# Patient Record
Sex: FEMALE | Race: WHITE | Employment: OTHER | URBAN - METROPOLITAN AREA
[De-identification: names, ages, dates, MRNs, and addresses within clinical notes are randomized per-mention and may not be internally consistent; named-entity substitution may affect disease eponyms.]

---

## 2017-03-31 PROBLEM — M35.00 SJOGREN'S DISEASE (HCC): Status: ACTIVE | Noted: 2017-03-31

## 2017-03-31 NOTE — PROGRESS NOTES
[facility] [place]  PROGRESS NOTE  Chief Complaint:   Patient presents with:  Medication Follow-Up      HPI:   This is a 81year old female coming in for medication follow-up. She said that she has been feeling worse recently.   She has had ryan MCG Oral Tab Take 1 tablet by mouth every morning before breakfast. Disp:  Rfl:    Calcium Carbonate-Vitamin D (CALCIUM + D OR) Take 1 tablet by mouth daily.  Disp:  Rfl:    FLUoxetine HCl 20 MG Oral Tab Take 60 mg daily (40 mg plus 20 mg) Disp: 30 tablet R atraumatic Eyes: EOMI, PERRLA, no scleral icterus, conjunctivae clear bilaterally, no eye discharge Ears: External normal. Nose: patent, no nasal discharge Throat:  No tonsillar erythema or exudate. Mouth:  No oral lesions or ulcerations, good dentition. to learning. Medical education done. Outcome: Patient verbalizes understanding. Patient is notified to call with any questions, complications, allergies, or worsening or changing symptoms.   Patient is to call with any side effects or complications from t

## 2017-04-28 NOTE — H&P
[facility] [place]  PRE-OP NOTE    HPI:   Jersey Deng is a 81year old female with a hx of ***, who presents for a pre-operative physical exam. Patient is to have ***, to by done by Dr. Yesenia Andrews at [facility] on ***.      No results found for this or any or fatigue. EENT:  Eyes:  Denies eye pain, visual loss, blurred vision, double vision or yellow sclerae. Ears, Nose, Throat:  Denies hearing loss, sneezing, congestion, runny nose or sore throat.   INTEGUMENTARY:  Denies rashes, itching, skin lesion, or ex ulcerations, good dentition. NECK: Supple, no CLAD, no JVD, no carotid bruit, no thyromegaly. SKIN: No rashes, no skin lesion, no bruising, good turgor. HEART:  Regular rate and rhythm, no murmurs, rubs or gallops.   LUNGS: Clear to auscultation bilteral

## 2017-04-28 NOTE — PROGRESS NOTES
[facility] [place]  PROGRESS NOTE  Chief Complaint:   Patient presents with:  Medication Follow-Up      HPI:   This is a 81year old female coming in for low up on her medication. She feels much better. Her anxiety level has dropped.   She is n Answered       REVIEW OF SYSTEMS:   CONSTITUTIONAL:  Denies unusual weight gain/loss, fever, chills, or fatigue. EENT:  Eyes:  Denies eye pain, visual loss, blurred vision, double vision or yellow sclerae.  Ears, Nose, Throat:  Denies hearing loss, sneezin now.      Meds & Refills for this Visit:  No prescriptions requested or ordered in this encounter    Patient/Caregiver Education: Patient/Caregiver Education: There are no barriers to learning. Medical education done.    Outcome: Patient verbalizes Sophia

## 2017-06-08 ENCOUNTER — TELEPHONE (OUTPATIENT)
Dept: FAMILY MEDICINE CLINIC | Facility: CLINIC | Age: 72
End: 2017-06-08

## 2017-06-08 DIAGNOSIS — Z00.00 HEALTH CARE MAINTENANCE: ICD-10-CM

## 2017-06-08 DIAGNOSIS — E03.9 ACQUIRED HYPOTHYROIDISM: Primary | ICD-10-CM

## 2017-06-08 NOTE — TELEPHONE ENCOUNTER
As above. Please advise lab orders.  Liza Medina, 06/08/2017, 11:51 AM    Future Appointments  Date Time Provider Guerita Verdin   7/28/2017 9:00 AM Aamir Flores MD [facility] [place] [facility]   10/6/2017 8:15 AM REF [place] REF [facility]

## 2017-07-28 NOTE — PROGRESS NOTES
[address]  PROGRESS NOTE  Chief Complaint:   Patient presents with: Follow - Up      HPI:   This is a 81year old female coming in for follow-up.     She has been doing very well on the combination of fluoxetine 40 mg and fluoxetine 20 20 mg) Disp: 30 capsule Rfl: 5      Counseling given: Not Answered       REVIEW OF SYSTEMS:   CONSTITUTIONAL:  Denies unusual weight gain/loss, fever, chills, or fatigue.   EENT:  Eyes:  Denies eye pain, visual loss, blurred vision, double vision or yellow good turgor. HEART:  Regular rate and rhythm, no murmurs, rubs or gallops. LUNGS: Clear to auscultation bilterally, no rales/rhonchi/wheezing. ASSESSMENT AND PLAN:   1.  Recurrent major depressive disorder, in partial remission ([facility] [address]  Her depression is

## 2017-08-22 ENCOUNTER — MED REC SCAN ONLY (OUTPATIENT)
Dept: FAMILY MEDICINE CLINIC | Facility: CLINIC | Age: 72
End: 2017-08-22

## 2017-09-30 RX ORDER — LEVOTHYROXINE SODIUM 88 MCG
TABLET ORAL
Qty: 90 TABLET | Refills: 0 | Status: SHIPPED | OUTPATIENT
Start: 2017-09-30 | End: 2017-12-26

## 2017-09-30 NOTE — TELEPHONE ENCOUNTER
Last RF Synthroid: 5/13/13    Future appt:     Your appointments     Date & Time Appointment Department [facility])    Oct 06, 2017  8:15 AM CDT Laboratory Visit with SELINA Zel[facility] ([facility])    Oct 10, 2017  9:30 AM CDT [facility]

## 2017-10-06 ENCOUNTER — LABORATORY ENCOUNTER (OUTPATIENT)
Dept: LAB | Age: 72
End: 2017-10-06
Attending: FAMILY MEDICINE
Payer: MEDICARE

## 2017-10-06 DIAGNOSIS — Z00.00 HEALTH CARE MAINTENANCE: ICD-10-CM

## 2017-10-06 DIAGNOSIS — E03.9 ACQUIRED HYPOTHYROIDISM: ICD-10-CM

## 2017-10-06 PROCEDURE — 80053 COMPREHEN METABOLIC PANEL: CPT

## 2017-10-06 PROCEDURE — 85025 COMPLETE CBC W/AUTO DIFF WBC: CPT

## 2017-10-06 PROCEDURE — 84443 ASSAY THYROID STIM HORMONE: CPT

## 2017-10-06 PROCEDURE — 80061 LIPID PANEL: CPT

## 2017-10-06 PROCEDURE — 84550 ASSAY OF BLOOD/URIC ACID: CPT

## 2017-10-06 PROCEDURE — 36415 COLL VENOUS BLD VENIPUNCTURE: CPT

## 2017-10-10 ENCOUNTER — OFFICE VISIT (OUTPATIENT)
Dept: FAMILY MEDICINE CLINIC | Facility: CLINIC | Age: 72
End: 2017-10-10

## 2017-10-10 VITALS
RESPIRATION RATE: 14 BRPM | DIASTOLIC BLOOD PRESSURE: 72 MMHG | HEART RATE: 88 BPM | HEIGHT: 58.5 IN | BODY MASS INDEX: 21.86 KG/M2 | SYSTOLIC BLOOD PRESSURE: 122 MMHG | TEMPERATURE: 98 F | WEIGHT: 107 LBS

## 2017-10-10 DIAGNOSIS — F43.10 POSTTRAUMATIC STRESS DISORDER: ICD-10-CM

## 2017-10-10 DIAGNOSIS — M16.0 PRIMARY OSTEOARTHRITIS OF BOTH HIPS: ICD-10-CM

## 2017-10-10 DIAGNOSIS — E78.49 FAMILIAL MULTIPLE LIPOPROTEIN-TYPE HYPERLIPIDEMIA: ICD-10-CM

## 2017-10-10 DIAGNOSIS — Z00.00 ENCOUNTER FOR ANNUAL HEALTH EXAMINATION: Primary | ICD-10-CM

## 2017-10-10 DIAGNOSIS — M81.0 AGE-RELATED OSTEOPOROSIS WITHOUT CURRENT PATHOLOGICAL FRACTURE: ICD-10-CM

## 2017-10-10 DIAGNOSIS — K11.7 DISTURBANCE OF SALIVARY SECRETION: ICD-10-CM

## 2017-10-10 DIAGNOSIS — G43.909 MIGRAINE WITHOUT STATUS MIGRAINOSUS, NOT INTRACTABLE, UNSPECIFIED MIGRAINE TYPE: ICD-10-CM

## 2017-10-10 DIAGNOSIS — L20.82 FLEXURAL ECZEMA: ICD-10-CM

## 2017-10-10 DIAGNOSIS — E03.9 ACQUIRED HYPOTHYROIDISM: ICD-10-CM

## 2017-10-10 DIAGNOSIS — F33.41 RECURRENT MAJOR DEPRESSIVE DISORDER, IN PARTIAL REMISSION (HCC): ICD-10-CM

## 2017-10-10 DIAGNOSIS — M35.01 SJOGREN'S SYNDROME WITH KERATOCONJUNCTIVITIS SICCA (HCC): ICD-10-CM

## 2017-10-10 PROCEDURE — G0009 ADMIN PNEUMOCOCCAL VACCINE: HCPCS | Performed by: FAMILY MEDICINE

## 2017-10-10 PROCEDURE — 99214 OFFICE O/P EST MOD 30 MIN: CPT | Performed by: FAMILY MEDICINE

## 2017-10-10 PROCEDURE — 90653 IIV ADJUVANT VACCINE IM: CPT | Performed by: FAMILY MEDICINE

## 2017-10-10 PROCEDURE — 90732 PPSV23 VACC 2 YRS+ SUBQ/IM: CPT | Performed by: FAMILY MEDICINE

## 2017-10-10 PROCEDURE — G0439 PPPS, SUBSEQ VISIT: HCPCS | Performed by: FAMILY MEDICINE

## 2017-10-10 PROCEDURE — G0008 ADMIN INFLUENZA VIRUS VAC: HCPCS | Performed by: FAMILY MEDICINE

## 2017-10-10 RX ORDER — OMEGA-3 FATTY ACIDS/FISH OIL 300-1000MG
1 CAPSULE ORAL AS NEEDED
COMMUNITY

## 2017-10-10 NOTE — PROGRESS NOTES
[facility] [place]  PROGRESS NOTE  Chief Complaint:   Patient presents with:  Physical      HPI:   This is a 81year old female coming in for annual Medicare wellness exam.  She said that overall she has been feeling good and doing well.     Her 219 (H) <130 mg/dL   -ASSAY, THYROID STIM HORMONE   Result Value Ref Range   TSH 3.340 0.350 - 5.500 mIU/mL   -URIC ACID, SERUM   Result Value Ref Range   Uric Acid 4.9 2.4 - 8.0 mg/dL   -CBC W/ DIFFERENTIAL   Result Value Ref Range   WBC 5.9 4.0 - 13.0 x1 Outpatient Prescriptions:  Ibuprofen 200 MG Oral Cap Take 1 capsule by mouth as needed.  Disp:  Rfl:    SYNTHROID 88 MCG Oral Tab TAKE ONE TABLET BY MOUTH EVERY DAY Disp: 90 tablet Rfl: 0   Carboxymethylcellulose Sodium (REFRESH LIQUIGEL) 1 % Ophthalmic Sol Pulse 88   Temp 97.7 °F (36.5 °C) (Tympanic)   Resp 14   Ht 58.5\"   Wt 107 lb   BMI 21.98 kg/m²  Estimated body mass index is 21.98 kg/m² as calculated from the following:    Height as of this encounter: 58.5\". Weight as of this encounter: 107 lb.    V treatment recommended for this now. 4. Sjogren's syndrome with keratoconjunctivitis sicca ([address]  Her children's syndrome has improved dramatically. No new treatment recommended for this now.     5. Disturbance of salivary secretion  Her salivary secreti disease ([facility]     Primary osteoarthritis of both hips      Rafaela Muñoz MD  10/10/2017  10:13 AM

## 2017-10-16 ENCOUNTER — MED REC SCAN ONLY (OUTPATIENT)
Dept: FAMILY MEDICINE CLINIC | Facility: CLINIC | Age: 72
End: 2017-10-16

## 2017-11-30 ENCOUNTER — OFFICE VISIT (OUTPATIENT)
Dept: FAMILY MEDICINE CLINIC | Facility: CLINIC | Age: 72
End: 2017-11-30

## 2017-11-30 VITALS
TEMPERATURE: 99 F | SYSTOLIC BLOOD PRESSURE: 142 MMHG | HEART RATE: 100 BPM | HEIGHT: 58.5 IN | DIASTOLIC BLOOD PRESSURE: 78 MMHG | WEIGHT: 108 LBS | BODY MASS INDEX: 22.07 KG/M2

## 2017-11-30 DIAGNOSIS — B02.9 HERPES ZOSTER WITHOUT COMPLICATION: Primary | ICD-10-CM

## 2017-11-30 DIAGNOSIS — R03.0 ELEVATED BLOOD PRESSURE READING WITHOUT DIAGNOSIS OF HYPERTENSION: ICD-10-CM

## 2017-11-30 PROCEDURE — 99214 OFFICE O/P EST MOD 30 MIN: CPT | Performed by: FAMILY MEDICINE

## 2017-11-30 RX ORDER — VALACYCLOVIR HYDROCHLORIDE 1 G/1
1 TABLET, FILM COATED ORAL 3 TIMES DAILY
Qty: 21 TABLET | Refills: 0 | Status: SHIPPED | OUTPATIENT
Start: 2017-11-30 | End: 2017-12-07

## 2017-11-30 NOTE — PATIENT INSTRUCTIONS
Start valacyclovir today. Use tylenol or ibuprofen as needed   Use topical calamine lotion as needed   Return to clinic if any increase redness, pain, warmth, fever or oozing. Monitor blood pressure, return to clinic if remains elevated.

## 2017-11-30 NOTE — PROGRESS NOTES
[facility] [place]  PROGRESS NOTE  Chief Complaint:   Patient presents with:  Shingles      HPI:   This is a 81year old female presents complaining of painful rash that started on left flank region yesterday.   Patient initially had painful sens 0. 57 0.10 - 0.60 x10(3) uL   Eosinophil Absolute 0.24 0.00 - 0.30 x10(3) uL   Basophil Absolute 0.06 0.00 - 0.10 x10(3) uL   Immature Granulocyte Absolute 0.01 0.00 - 1.00 x10(3) uL   Neutrophil % 53.1 %   Lymphocyte % 31.9 %   Monocyte % 9.7 %   Eosinophi Disp: 30 capsule Rfl: 5      Counseling given: Not Answered         REVIEW OF SYSTEMS:   CONSTITUTIONAL:  Denies unusual weight gain/loss, fever, chills, or fatigue.    INTEGUMENTARY: See HPI  CARDIOVASCULAR:  Denies chest pain, chest pressure, chest discom Normal ROM, no joint pain, or muscle weakness in all extremity. BACK: No tenderness, no spasm, SLR test negative, FROM. NEUROLOGICAL:  No deficit, normal gait, strength and tone, sensory intact, normal reflexes.   PSYCHIATRIC: Alert and oriented x 3; af

## 2017-12-04 ENCOUNTER — OFFICE VISIT (OUTPATIENT)
Dept: FAMILY MEDICINE CLINIC | Facility: CLINIC | Age: 72
End: 2017-12-04

## 2017-12-04 VITALS
HEIGHT: 59 IN | TEMPERATURE: 97 F | WEIGHT: 108.13 LBS | HEART RATE: 90 BPM | BODY MASS INDEX: 21.8 KG/M2 | DIASTOLIC BLOOD PRESSURE: 86 MMHG | SYSTOLIC BLOOD PRESSURE: 160 MMHG | RESPIRATION RATE: 14 BRPM

## 2017-12-04 DIAGNOSIS — F41.1 ANXIETY STATE: ICD-10-CM

## 2017-12-04 DIAGNOSIS — B02.9 HERPES ZOSTER WITHOUT COMPLICATION: Primary | ICD-10-CM

## 2017-12-04 DIAGNOSIS — F33.41 RECURRENT MAJOR DEPRESSIVE DISORDER, IN PARTIAL REMISSION (HCC): ICD-10-CM

## 2017-12-04 PROCEDURE — 99213 OFFICE O/P EST LOW 20 MIN: CPT | Performed by: FAMILY MEDICINE

## 2017-12-04 NOTE — PROGRESS NOTES
[facility] [place]  PROGRESS NOTE  Chief Complaint:   Patient presents with:  Shingles: Continously spreading      HPI:   This is a 81year old female coming in for follow-up on her shingles.     She saw Dr. Cherie Woodward November 30 and was started on g/dL   RDW 12.2 11.5 - 16.0 %   RDW-SD 43.2 35.1 - 46.3 fL   Neutrophil Absolute Prelim 3.12 1.30 - 6.70 x10 (3) uL   Neutrophil Absolute 3.12 1.30 - 6.70 x10(3) uL   Lymphocyte Absolute 1.87 0.90 - 4.00 x10(3) uL   Monocyte Absolute 0.57 0.10 - 0.60 x10(3 (CALCIUM + D OR) Take 1 tablet by mouth daily.  Disp:  Rfl:    FLUoxetine HCl 40 MG Oral Cap Take 60 mg daily (40 mg plus 20 mg) (Patient taking differently: Take 40 mg by mouth daily.  ) Disp: 30 capsule Rfl: 5      Counseling given: Not Answered       REV developed, well nourished, no apparent distress.   HEENT:  Head:  Normocephalic, atraumatic Eyes: EOMI, PERRLA, no scleral icterus, conjunctivae clear bilaterally, no eye discharge Ears: External normal. Nose: patent, no nasal discharge Throat:  No tonsilla Outcome: Patient verbalizes understanding. Patient is notified to call with any questions, complications, allergies, or worsening or changing symptoms. Patient is to call with any side effects or complications from the treatments as a result of today.

## 2017-12-13 ENCOUNTER — OFFICE VISIT (OUTPATIENT)
Dept: FAMILY MEDICINE CLINIC | Facility: CLINIC | Age: 72
End: 2017-12-13

## 2017-12-13 VITALS
HEART RATE: 88 BPM | DIASTOLIC BLOOD PRESSURE: 82 MMHG | RESPIRATION RATE: 16 BRPM | HEIGHT: 59 IN | WEIGHT: 107.5 LBS | SYSTOLIC BLOOD PRESSURE: 148 MMHG | BODY MASS INDEX: 21.67 KG/M2 | TEMPERATURE: 96 F

## 2017-12-13 DIAGNOSIS — B02.9 HERPES ZOSTER WITHOUT COMPLICATION: ICD-10-CM

## 2017-12-13 DIAGNOSIS — R52 BODY ACHES: Primary | ICD-10-CM

## 2017-12-13 DIAGNOSIS — F33.1 MODERATE EPISODE OF RECURRENT MAJOR DEPRESSIVE DISORDER (HCC): ICD-10-CM

## 2017-12-13 PROCEDURE — 99214 OFFICE O/P EST MOD 30 MIN: CPT | Performed by: FAMILY MEDICINE

## 2017-12-13 RX ORDER — FLUOXETINE HYDROCHLORIDE 40 MG/1
CAPSULE ORAL
Qty: 30 CAPSULE | Refills: 5 | COMMUNITY
Start: 2017-12-13 | End: 2018-04-10

## 2017-12-13 RX ORDER — FLUOXETINE 20 MG/1
TABLET, FILM COATED ORAL
Qty: 30 TABLET | Refills: 5 | Status: SHIPPED | OUTPATIENT
Start: 2017-12-13 | End: 2018-05-15

## 2017-12-14 NOTE — PROGRESS NOTES
[address]  PROGRESS NOTE  Chief Complaint:   Patient presents with: Follow - Up: Shingles - still in a lot of pain      HPI:   This is a 81year old female coming in for follow-up on her shingles.   She reported that she has completed t STIM HORMONE   Result Value Ref Range   TSH 3.340 0.350 - 5.500 mIU/mL   -URIC ACID, SERUM   Result Value Ref Range   Uric Acid 4.9 2.4 - 8.0 mg/dL   -CBC W/ DIFFERENTIAL   Result Value Ref Range   WBC 5.9 4.0 - 13.0 x10(3) uL   RBC 4.34 3.80 - 5.10 x10(6) Prescriptions:  FLUoxetine HCl 40 MG Oral Cap Take 60 mg daily (40 mg plus 20 mg) Disp: 30 capsule Rfl: 5   Ibuprofen 200 MG Oral Cap Take 1 capsule by mouth as needed.  Disp:  Rfl:    SYNTHROID 88 MCG Oral Tab TAKE ONE TABLET BY MOUTH EVERY DAY Disp: 90 ta 148/82 (BP Location: Left arm, Patient Position: Sitting, Cuff Size: adult)   Pulse 88   Temp (!) 96.1 °F (35.6 °C) (Tympanic)   Resp 16   Ht 59\"   Wt 107 lb 8 oz   BMI 21.71 kg/m²  Estimated body mass index is 21.71 kg/m² as calculated from the following them.      Meds & Refills for this Visit:  No prescriptions requested or ordered in this encounter       Patient/Caregiver Education: Patient/Caregiver Education: There are no barriers to learning. Medical education done.    Outcome: Patient verbalizes unde

## 2017-12-26 RX ORDER — LEVOTHYROXINE SODIUM 88 MCG
TABLET ORAL
Qty: 90 TABLET | Refills: 3 | Status: SHIPPED | OUTPATIENT
Start: 2017-12-26 | End: 2019-01-17

## 2017-12-26 NOTE — TELEPHONE ENCOUNTER
Future appt:     Your appointments     Date & Time Appointment Department [facility])    Apr 10, 2018  9:30 AM CDT Follow up - Extended with Karishma Irving MD [facility] [place] ([facility])        [address] [place] [address]

## 2018-04-10 PROBLEM — B02.9 HERPES ZOSTER WITHOUT COMPLICATION: Status: RESOLVED | Noted: 2017-12-04 | Resolved: 2018-04-10

## 2018-04-10 NOTE — PROGRESS NOTES
[address]  PROGRESS NOTE  Chief Complaint:   Patient presents with: Follow - Up      HPI:   This is a Dominic Simmons [age]year old female coming in for follow-up on her medications. Her shingles have completely resolved.   She has a little bit of bur Prelim 3.12 1.30 - 6.70 x10 (3) uL   Neutrophil Absolute 3.12 1.30 - 6.70 x10(3) uL   Lymphocyte Absolute 1.87 0.90 - 4.00 x10(3) uL   Monocyte Absolute 0.57 0.10 - 0.60 x10(3) uL   Eosinophil Absolute 0.24 0.00 - 0.30 x10(3) uL   Basophil Absolute 0.06 0. + D OR) Take 2 tablets by mouth daily. Disp:  Rfl:       Counseling given: Not Answered       REVIEW OF SYSTEMS:   CONSTITUTIONAL:  Denies unusual weight gain/loss, fever, chills, or fatigue.   EENT:  Eyes:  Denies eye pain, visual loss, blurred vision, d apparent distress. HEENT:  Head:  Normocephalic, atraumatic Eyes: EOMI, PERRLA, no scleral icterus, conjunctivae clear bilaterally, no eye discharge Ears: External normal. Nose: patent, no nasal discharge Throat:  No tonsillar erythema or exudate.   Mouth: or female climacteric states     Migraine     Osteoporosis     Posttraumatic stress disorder     Sjogren's disease ([facility] [address]     Primary osteoarthritis of both hips     Elevated blood pressure reading without diagnosis of hypertension     Body aches      MU

## 2018-05-15 NOTE — PROGRESS NOTES
[facility] [place]  PROGRESS NOTE  Chief Complaint:   Patient presents with:  Medication Request: would like to adjust fluoxetine      HPI:   This is a 72year old female coming in for follow-up on her depression and anxiety.   She said that she - 6.70 x10 (3) uL   Neutrophil Absolute 3.12 1.30 - 6.70 x10(3) uL   Lymphocyte Absolute 1.87 0.90 - 4.00 x10(3) uL   Monocyte Absolute 0.57 0.10 - 0.60 x10(3) uL   Eosinophil Absolute 0.24 0.00 - 0.30 x10(3) uL   Basophil Absolute 0.06 0.00 - 0.10 x10(3) by Each eye route as needed. Disp:  Rfl:    Calcium Carbonate-Vitamin D (CALCIUM + D OR) Take 2 tablets by mouth daily.    Disp:  Rfl:       Counseling given: Not Answered       REVIEW OF SYSTEMS:   CONSTITUTIONAL:  Denies unusual weight gain/loss, fever, Nose: patent, no nasal discharge Throat:  No tonsillar erythema or exudate. Mouth:  No oral lesions or ulcerations, good dentition. NECK: Supple, no CLAD, no JVD, no thyromegaly. SKIN: No rashes, no skin lesion, no bruising, good turgor.   HEART:  Regula diagnosis of hypertension     Body aches      Cong Aguayo MD  5/15/2018  2:57 PM

## 2018-08-01 ENCOUNTER — TELEPHONE (OUTPATIENT)
Dept: FAMILY MEDICINE CLINIC | Facility: CLINIC | Age: 73
End: 2018-08-01

## 2018-08-01 NOTE — PROGRESS NOTES
[facility] [place]  PROGRESS NOTE  Chief Complaint:   Patient presents with:  Medication Problem: Fluoxetine - increased anxiety and depression      HPI:   This is a 72year old female coming in for increased anxiety.   She said that she has been 150.0 - 450.0 10(3)uL   MCV 96.5 81.0 - 100.0 fL   MCH 30.2 27.0 - 33.2 pg   MCHC 31.3 31.0 - 37.0 g/dL   RDW 12.2 11.5 - 16.0 %   RDW-SD 43.2 35.1 - 46.3 fL   Neutrophil Absolute Prelim 3.12 1.30 - 6.70 x10 (3) uL   Neutrophil Absolute 3.12 1.30 - 6.70 x1 Rfl:    Carboxymethylcellulose Sodium (REFRESH LIQUIGEL) 1 % Ophthalmic Solution 1 drop by Each eye route as needed. Disp:  Rfl:    Calcium Carbonate-Vitamin D (CALCIUM + D OR) Take 2 tablets by mouth daily.    Disp:  Rfl:       Counseling given: Not Answ encounter: 59\". Weight as of this encounter: 98 lb 6 oz. Vital signs reviewed. Physical Exam:  GEN:  Patient is alert, awake and oriented, well developed, well nourished, no apparent distress.   HEENT:  Head:  Normocephalic, atraumatic Eyes: EOMI, PE education done. Outcome: Patient verbalizes understanding. Patient is notified to call with any questions, complications, allergies, or worsening or changing symptoms.   Patient is to call with any side effects or complications from the treatments as a re

## 2018-08-01 NOTE — TELEPHONE ENCOUNTER
Pt scheduled to see Dr. Gisela Hurd today.      Future Appointments  Date Time Provider Guerita Velvet   8/1/2018 1:00 PM Star Martinez MD [facility] [place] [facility] [place]   8/8/2018 9:00 AM Star Martinez MD [facility] [place] [facility] [place]   10/9/2018 8:00 AM

## 2018-08-08 NOTE — PROGRESS NOTES
[address]  PROGRESS NOTE  Chief Complaint:   Patient presents with: Follow - Up      HPI:   This is a 72year old female coming in for follow-up on her depression. She has increase the fluoxetine to 80 mg daily.   She said she is feeli 0.00 - 0.30 x10(3) uL   Basophil Absolute 0.06 0.00 - 0.10 x10(3) uL   Immature Granulocyte Absolute 0.01 0.00 - 1.00 x10(3) uL   Neutrophil % 53.1 %   Lymphocyte % 31.9 %   Monocyte % 9.7 %   Eosinophil % 4.1 %   Basophil % 1.0 %   Immature Granulocyte % Denies eye pain, visual loss, blurred vision, double vision or yellow sclerae. Ears, Nose, Throat:  Denies hearing loss, sneezing, congestion, runny nose or sore throat. INTEGUMENTARY:  Denies rashes, itching, skin lesion, or excessive skin dryness.   CARD erythema or exudate. Mouth:  No oral lesions or ulcerations, good dentition. NECK: Supple, no CLAD, no JVD, no thyromegaly. SKIN: No rashes, no skin lesion, no bruising, good turgor. HEART:  Regular rate and rhythm, no murmurs, rubs or gallops.   LUNGS: hyperlipidemia     Hypothyroidism     Symptomatic menopausal or female climacteric states     Migraine     Osteoporosis     Posttraumatic stress disorder     Sjogren's disease ([address]     Primary osteoarthritis of both hips     Elevated blood pressure readin

## 2018-08-27 ENCOUNTER — MED REC SCAN ONLY (OUTPATIENT)
Dept: FAMILY MEDICINE CLINIC | Facility: CLINIC | Age: 73
End: 2018-08-27

## 2018-08-28 NOTE — PROGRESS NOTES
[address]  PROGRESS NOTE  Chief Complaint:   Patient presents with: Follow - Up      HPI:   This is a 72year old female coming in for follow-up on her depression and anxiety. She has been taking fluoxetine 80 mg daily.   This has not RDW-SD 43.2 35.1 - 46.3 fL   Neutrophil Absolute Prelim 3.12 1.30 - 6.70 x10 (3) uL   Neutrophil Absolute 3.12 1.30 - 6.70 x10(3) uL   Lymphocyte Absolute 1.87 0.90 - 4.00 x10(3) uL   Monocyte Absolute 0.57 0.10 - 0.60 x10(3) uL   Eosinophil Absolute 0.2 Carboxymethylcellulose Sodium (REFRESH LIQUIGEL) 1 % Ophthalmic Solution 1 drop by Each eye route as needed. Disp:  Rfl:    Calcium Carbonate-Vitamin D (CALCIUM + D OR) Take 2 tablets by mouth daily.    Disp:  Rfl:       Counseling given: Not Answered She is somewhat shaky. She smiled through the course of the interview and did not cry.   HEENT:  Head:  Normocephalic, atraumatic Eyes: EOMI, PERRLA, no scleral icterus, conjunctivae clear bilaterally, no eye discharge Ears: External normal. Nose: patent, side effects or complications from the treatments as a result of today.      Problem List:  Patient Active Problem List:     Anxiety state     Depression     Tear film insufficiency     Disturbance of salivary secretion     Eczema     Familial multiple lipo

## 2018-08-29 ENCOUNTER — TELEPHONE (OUTPATIENT)
Dept: FAMILY MEDICINE CLINIC | Facility: CLINIC | Age: 73
End: 2018-08-29

## 2018-08-29 NOTE — TELEPHONE ENCOUNTER
Prior Authorization calling to confirm what the risks were for patient to take the Paroxitine. Also asking if the benefit from the medication outways the risk. Informed yes to both.     Informed again that patient is weaning from fluoxitine to only be on

## 2018-08-31 NOTE — TELEPHONE ENCOUNTER
Medication Approved until 8/2019. Approval faxed to [place].  See scanned image also of signed document by Dr Janae Jung  Confirming below.   Kelly Swann, 08/31/18, 9:25 AM

## 2018-10-08 NOTE — PROGRESS NOTES
[facility] [place]  PROGRESS NOTE  Chief Complaint:   Patient presents with:  Medication Follow-Up      HPI:   This is a 72year old female coming in for follow-up on her medications. She was able to successfully stop the fluoxetine.   She is no 3. 12 1.30 - 6.70 x10 (3) uL    Neutrophil Absolute 3.12 1.30 - 6.70 x10(3) uL    Lymphocyte Absolute 1.87 0.90 - 4.00 x10(3) uL    Monocyte Absolute 0.57 0.10 - 0.60 x10(3) uL    Eosinophil Absolute 0.24 0.00 - 0.30 x10(3) uL    Basophil Absolute 0.06 0.00 route as needed. Disp:  Rfl:    Calcium Carbonate-Vitamin D (CALCIUM + D OR) Take 2 tablets by mouth daily.    Disp:  Rfl:       Counseling given: Not Answered       REVIEW OF SYSTEMS:   CONSTITUTIONAL:  Denies unusual weight gain/loss, fever, chills, or well developed, well nourished, no apparent distress.   HEENT:  Head:  Normocephalic, atraumatic Eyes: EOMI, PERRLA, no scleral icterus, conjunctivae clear bilaterally, no eye discharge Ears: External normal. Nose: patent, no nasal discharge Throat:  No ton disorder     [facility]'s disease ([facility]     Primary osteoarthritis of both hips     Elevated blood pressure reading without diagnosis of hypertension     Body aches      Brian Brooks MD  10/8/2018  10:42 AM

## 2018-10-10 ENCOUNTER — LABORATORY ENCOUNTER (OUTPATIENT)
Dept: LAB | Age: 73
End: 2018-10-10
Attending: FAMILY MEDICINE
Payer: MEDICARE

## 2018-10-10 ENCOUNTER — TELEPHONE (OUTPATIENT)
Dept: FAMILY MEDICINE CLINIC | Facility: CLINIC | Age: 73
End: 2018-10-10

## 2018-10-10 DIAGNOSIS — F41.1 ANXIETY STATE: ICD-10-CM

## 2018-10-10 DIAGNOSIS — E03.9 ACQUIRED HYPOTHYROIDISM: ICD-10-CM

## 2018-10-10 DIAGNOSIS — E78.49 FAMILIAL MULTIPLE LIPOPROTEIN-TYPE HYPERLIPIDEMIA: ICD-10-CM

## 2018-10-10 DIAGNOSIS — F33.1 MODERATE EPISODE OF RECURRENT MAJOR DEPRESSIVE DISORDER (HCC): ICD-10-CM

## 2018-10-10 PROCEDURE — 84443 ASSAY THYROID STIM HORMONE: CPT

## 2018-10-10 PROCEDURE — 36415 COLL VENOUS BLD VENIPUNCTURE: CPT

## 2018-10-10 PROCEDURE — 80061 LIPID PANEL: CPT

## 2018-10-10 PROCEDURE — 80053 COMPREHEN METABOLIC PANEL: CPT

## 2018-10-10 PROCEDURE — 85025 COMPLETE CBC W/AUTO DIFF WBC: CPT

## 2018-10-10 PROCEDURE — 84550 ASSAY OF BLOOD/URIC ACID: CPT

## 2018-10-11 NOTE — TELEPHONE ENCOUNTER
----- Message from Farzaneh Perea MD sent at 10/10/2018  6:49 PM CDT -----  Please call Namrata Fang. Her blood count is normal.  Her thyroid is slightly low but I do not recommend any changes in medication now.   Her cholesterol is 283 which is similar to las

## 2018-10-12 NOTE — PATIENT INSTRUCTIONS
Recommended Websites for Advanced Directives    [employer]. org/publications/Documents/personal_dec. pdf  An information packet, including necessary form from the [employer] website. http://www. [place].state. [place]/public/books/adv

## 2018-10-12 NOTE — PROGRESS NOTES
[facility] [place]  PROGRESS NOTE  Chief Complaint:   Patient presents with:  Physical      HPI:   This is a 72year old female coming in for her annual Medicare wellness exam.    She feels like the depression is starting to improve a little bit 5.10 x10(6)uL    HGB 12.2 12.0 - 16.0 g/dL    HCT 39.4 34.0 - 50.0 %    .0 150.0 - 450.0 10(3)uL    MCV 94.3 81.0 - 100.0 fL    MCH 29.2 27.0 - 33.2 pg    MCHC 31.0 31.0 - 37.0 g/dL    RDW 12.9 11.5 - 16.0 %    RDW-SD 44.4 35.1 - 46.3 fL    Neutroph every morning. Disp: 30 tablet Rfl: 5   SYNTHROID 88 MCG Oral Tab TAKE ONE TABLET BY MOUTH EVERY DAY Disp: 90 tablet Rfl: 3   Ibuprofen 200 MG Oral Cap Take 1 capsule by mouth as needed.  Disp:  Rfl:    Carboxymethylcellulose Sodium (REFRESH LIQUIGEL) 1 % O 96.2 °F (35.7 °C) (Tympanic)   Resp 16   Ht 60\"   Wt 103 lb 2 oz   BMI 20.14 kg/m²  Estimated body mass index is 20.14 kg/m² as calculated from the following:    Height as of this encounter: 60\". Weight as of this encounter: 103 lb 2 oz.    Vital signs dose of Synthroid now. If her next TSH is also elevated then we will increase the dose of her Synthroid. 5. Flexural eczema  She has a history of flexural eczema. It is not flaring now.     6. Sjogren's syndrome with keratoconjunctivitis sicca ([facility] [address]  S

## 2018-10-29 NOTE — PROGRESS NOTES
[facility] [place]  PROGRESS NOTE  Chief Complaint:   Patient presents with:  Depression: anti depression medication is not helping  Diarrhea      HPI:   This is a 72year old female coming in for several concerns.   First, she feels like the swi ASSAY, THYROID STIM HORMONE   Result Value Ref Range    TSH 6.930 (H) 0.350 - 5.500 mIU/mL   URIC ACID, SERUM   Result Value Ref Range    Uric Acid 5.1 2.4 - 8.0 mg/dL   CBC W/ DIFFERENTIAL   Result Value Ref Range    WBC 6.7 4.0 - 13.0 x10(3) uL    RBC Comment: At stress test - made pt feel \"crazy'  Amoxicillin-Pot Cla*        Comment:Other reaction(s): rash itching  Sulfa Antibiotics         Current Meds:    Current Outpatient Medications:  Venlafaxine HCl 37.5 MG Oral Tab Take 1 tablet (37.5 mg total) asthma, sneezing, hives, eczema or rhinitis.      EXAM:   /80 (BP Location: Left arm, Patient Position: Sitting, Cuff Size: adult)   Pulse 80   Temp 98.6 °F (37 °C) (Temporal)   Resp 18   Ht 60\"   Wt 101 lb   BMI 19.73 kg/m²  Estimated body mass inde Patient/Caregiver Education: There are no barriers to learning. Medical education done. Outcome: Patient verbalizes understanding. Patient is notified to call with any questions, complications, allergies, or worsening or changing symptoms.   Patient is to

## 2018-11-30 NOTE — PROGRESS NOTES
[address]  PROGRESS NOTE  Chief Complaint:   Patient presents with: Follow - Up      HPI:   This is a 72year old female coming in for follow-up on her new depression medication. She has been taking venlafaxine 37.5 mg twice a day.   Phoebe Lion MCHC 31.0 31.0 - 37.0 g/dL    RDW 12.9 11.5 - 16.0 %    RDW-SD 44.4 35.1 - 46.3 fL    Neutrophil Absolute Prelim 4.13 1.30 - 6.70 x10 (3) uL    Neutrophil Absolute 4.13 1.30 - 6.70 x10(3) uL    Lymphocyte Absolute 1.38 0.90 - 4.00 x10(3) uL    Monocyte ONE TABLET BY MOUTH EVERY DAY Disp: 90 tablet Rfl: 3   Ibuprofen 200 MG Oral Cap Take 1 capsule by mouth as needed. Disp:  Rfl:    Carboxymethylcellulose Sodium (REFRESH LIQUIGEL) 1 % Ophthalmic Solution 1 drop by Each eye route as needed.    Disp:  Rfl: scleral icterus, conjunctivae clear bilaterally, no eye discharge Ears: External normal. Nose: patent, no nasal discharge Throat:  No tonsillar erythema or exudate. Mouth:  No oral lesions or ulcerations, good dentition.   NECK: Supple, no CLAD, no JVD, no PM

## 2018-12-18 ENCOUNTER — OFFICE VISIT (OUTPATIENT)
Dept: FAMILY MEDICINE CLINIC | Facility: CLINIC | Age: 73
End: 2018-12-18
Payer: MEDICARE

## 2018-12-18 VITALS
SYSTOLIC BLOOD PRESSURE: 136 MMHG | HEIGHT: 60 IN | HEART RATE: 110 BPM | WEIGHT: 97.5 LBS | BODY MASS INDEX: 19.14 KG/M2 | DIASTOLIC BLOOD PRESSURE: 84 MMHG | RESPIRATION RATE: 14 BRPM | TEMPERATURE: 97 F

## 2018-12-18 DIAGNOSIS — F33.1 MODERATE EPISODE OF RECURRENT MAJOR DEPRESSIVE DISORDER (HCC): ICD-10-CM

## 2018-12-18 DIAGNOSIS — R41.3 MEMORY LOSS: Primary | ICD-10-CM

## 2018-12-18 PROCEDURE — 99214 OFFICE O/P EST MOD 30 MIN: CPT | Performed by: FAMILY MEDICINE

## 2018-12-18 RX ORDER — LAMOTRIGINE 25 MG/1
25 TABLET ORAL DAILY
Qty: 30 TABLET | Refills: 0 | Status: SHIPPED | OUTPATIENT
Start: 2018-12-18 | End: 2019-01-08

## 2018-12-18 NOTE — PROGRESS NOTES
[facility] [place]  PROGRESS NOTE  Chief Complaint:   Patient presents with:  Memory Loss: Family is concerned about progressing memory issues      HPI:   This is a 72year old female coming in for progressive memory loss.   The family reports th -American 85 >=60    AST 22 15 - 41 U/L    Alt 27 14 - 54 U/L    Alkaline Phosphatase 74 55 - 142 U/L    Bilirubin, Total 0.3 0.1 - 2.0 mg/dL    Total Protein 8.0 6.4 - 8.2 g/dL    Albumin 3.5 3.1 - 4.5 g/dL    Globulin  4.5 (H) 2.8 - 4.4 g/dL    A/ tobacco: Never Used    Alcohol use:  Yes      Alcohol/week: 1.2 oz      Types: 2 Glasses of wine per week    Drug use: No    Family History:  Family History   Problem Relation Age of Onset   • Other (Other) Mother      Allergies:    Iodine (Topical) bruising. LYMPHATICS:  Denies enlarged nodes or history of splenectomy. PSYCHIATRIC: Although her depression is somewhat better her memory loss is been dramatically worse since starting the venlafaxine. Her family notes dramatic mood swings as well.   EN Moderate episode of recurrent major depressive disorder ([facility] [address]  She does continue to show signs and symptoms of depression but she has not done well with any of the antidepressants that we have tried.   The next step will be to try a mood stabilizer first to

## 2019-01-09 RX ORDER — LAMOTRIGINE 25 MG/1
TABLET ORAL
Qty: 30 TABLET | Refills: 1 | Status: SHIPPED | OUTPATIENT
Start: 2019-01-09 | End: 2019-01-15

## 2019-01-09 NOTE — TELEPHONE ENCOUNTER
Future appt:     Your appointments     Date & Time Appointment Department [facility])    Lee 15, 2019 11:00 AM CST Follow up with Betty Denis MD [address] [place] [address])    Mar 01, 2019 10:00 AM CST Ex

## 2019-01-15 ENCOUNTER — TELEPHONE (OUTPATIENT)
Dept: FAMILY MEDICINE CLINIC | Facility: CLINIC | Age: 74
End: 2019-01-15

## 2019-01-15 RX ORDER — LAMOTRIGINE 50 MG/1
50 TABLET, EXTENDED RELEASE ORAL DAILY
Qty: 30 TABLET | Refills: 5 | Status: SHIPPED | OUTPATIENT
Start: 2019-01-15 | End: 2019-01-16

## 2019-01-15 NOTE — PROGRESS NOTES
[facility] [place]  PROGRESS NOTE  Chief Complaint:   Patient presents with:  Medication Follow-Up: Lamotrigine      HPI:   This is a 72year old female coming in for follow-up on her medication.   She is not having any side effects from the lamo 1.0 - 2.0   ASSAY, THYROID STIM HORMONE   Result Value Ref Range    TSH 6.930 (H) 0.350 - 5.500 mIU/mL   URIC ACID, SERUM   Result Value Ref Range    Uric Acid 5.1 2.4 - 8.0 mg/dL   CBC W/ DIFFERENTIAL   Result Value Ref Range    WBC 6.7 4.0 - 13.0 x10(3) COMMENTS)    Comment: At stress test - made pt feel \"crazy'  Amoxicillin-Pot Cla*        Comment:Other reaction(s): rash itching  Sulfa Antibiotics         Current Meds:    Current Outpatient Medications:  naproxen 250 MG Oral Tab Take 250 mg by mouth 2 (t cleared.       EXAM:   /72 (BP Location: Left arm, Patient Position: Sitting, Cuff Size: adult)   Pulse 90   Temp (!) 96.4 °F (35.8 °C) (Tympanic)   Resp 14   Ht 60\"   Wt 100 lb 2 oz   BMI 19.55 kg/m²  Estimated body mass index is 19.55 kg/m² as calc symptoms. Patient is to call with any side effects or complications from the treatments as a result of today.      Problem List:  Patient Active Problem List:     Anxiety state     Depression     Tear film insufficiency     Disturbance of salivary secretio

## 2019-01-15 NOTE — TELEPHONE ENCOUNTER
Pharmacy questions if Dr Giselle Quesada wanted the disolveable tablets ordered or if new Rx for plain 50mg Lamotrigine tablets should be sent? Please advise.   Romy Garrett, 01/15/19, 11:44 AM

## 2019-01-16 ENCOUNTER — TELEPHONE (OUTPATIENT)
Dept: FAMILY MEDICINE CLINIC | Facility: CLINIC | Age: 74
End: 2019-01-16

## 2019-01-16 RX ORDER — LAMOTRIGINE 25 MG/1
25 TABLET ORAL 2 TIMES DAILY
Qty: 60 TABLET | Refills: 5 | Status: SHIPPED | OUTPATIENT
Start: 2019-01-16 | End: 2019-02-12

## 2019-01-16 NOTE — TELEPHONE ENCOUNTER
Pharmacy requesting change from Lamotrigine ER 50mg to Lamotrigine-Plain 25mg BID or plain 50mg daily. Please advise.   Subhash Calzada, 01/16/19, 3:34 PM

## 2019-01-17 NOTE — TELEPHONE ENCOUNTER
Future appt:     Your appointments     Date & Time Appointment Department [facility])    Feb 12, 2019 10:00 AM CST Follow up - Extended with Leti Yu MD [address], [address] ([facility])    Mar 01, 2019 10:0

## 2019-01-18 RX ORDER — LEVOTHYROXINE SODIUM 88 MCG
TABLET ORAL
Qty: 90 TABLET | Refills: 3 | Status: SHIPPED | OUTPATIENT
Start: 2019-01-18 | End: 2019-08-27

## 2019-02-12 NOTE — PROGRESS NOTES
[place] [facility] [place]  PROGRESS NOTE  Chief Complaint:   Patient presents with:  Medication Follow-Up      HPI:   This is a 72year old female coming in for follow-up on her medication. She said she is feeling better but still not great.   She sa URIC ACID, SERUM   Result Value Ref Range    Uric Acid 5.1 2.4 - 8.0 mg/dL   CBC W/ DIFFERENTIAL   Result Value Ref Range    WBC 6.7 4.0 - 13.0 x10(3) uL    RBC 4.18 3.80 - 5.10 x10(6)uL    HGB 12.2 12.0 - 16.0 g/dL    HCT 39.4 34.0 - 50.0 %    . 0 reaction(s): rash itching  Sulfa Antibiotics         Current Meds:    Current Outpatient Medications:  lamoTRIgine 25 MG Oral Tab Take 1 tablet (25 mg total) by mouth 2 (two) times daily.  Disp: 60 tablet Rfl: 5   escitalopram 5 MG Oral Tab Take 1 tablet (5 excessive sweating, cold or heat intolerance, polyuria or polydipsia. ALLERGIES:  Denies allergic response, history of asthma, sneezing, hives, eczema or rhinitis.      EXAM:   /62 (BP Location: Left arm, Patient Position: Sitting, Cuff Size: adult) 03/14/1945  DEXA Scan due on 03/14/2012  FIT Colorectal Screening due on 09/09/2016    Patient/Caregiver Education: Patient/Caregiver Education: There are no barriers to learning. Medical education done. Outcome: Patient verbalizes understanding.  Patient

## 2019-02-19 ENCOUNTER — TELEPHONE (OUTPATIENT)
Dept: FAMILY MEDICINE CLINIC | Facility: CLINIC | Age: 74
End: 2019-02-19

## 2019-02-19 NOTE — TELEPHONE ENCOUNTER
Please definitely stop taking the escitalopram.  I do not recommend adding another medication now and we need to give this time to get out of the system. We will wait a full week and see how things are going.

## 2019-02-19 NOTE — TELEPHONE ENCOUNTER
Patient states since starting Citalopram She has had increased depression. Has lashed out at . Woke up this morning crying. States the crying is what woke her up. She is stopping Citalopram.    Please advise.   Christine Sifuentes, 02/19/19, 12:05 PM

## 2019-03-12 NOTE — PROGRESS NOTES
[place] [facility] [place]  PROGRESS NOTE  Chief Complaint:   Patient presents with:  Medication Follow-Up      HPI:   This is a 72year old female coming in for follow-up on her medication. Her  is present with her today.   She reports that her 0.350 - 5.500 mIU/mL   URIC ACID, SERUM   Result Value Ref Range    Uric Acid 5.1 2.4 - 8.0 mg/dL   CBC W/ DIFFERENTIAL   Result Value Ref Range    WBC 6.7 4.0 - 13.0 x10(3) uL    RBC 4.18 3.80 - 5.10 x10(6)uL    HGB 12.2 12.0 - 16.0 g/dL    HCT 39.4 34.0 Comment: At stress test - made pt feel \"crazy'  Amoxicillin-Pot Cla*        Comment:Other reaction(s): rash itching  Sulfa Antibiotics         Venlafaxine             OTHER (SEE COMMENTS)    Comment:forgetfullness  Current Meds:    Current Outpatient Medic rhinitis.      EXAM:   /72 (BP Location: Left arm, Patient Position: Sitting, Cuff Size: adult)   Pulse 102   Temp 96.7 °F (35.9 °C) (Tympanic)   Resp 14   Ht 60\"   Wt 103 lb   BMI 20.12 kg/m²  Estimated body mass index is 20.12 kg/m² as calculated f understanding. Patient is notified to call with any questions, complications, allergies, or worsening or changing symptoms. Patient is to call with any side effects or complications from the treatments as a result of today.      Problem List:  Patient Acti

## 2019-04-19 PROBLEM — F41.1 GAD (GENERALIZED ANXIETY DISORDER): Status: ACTIVE | Noted: 2019-04-19

## 2019-04-19 NOTE — PROGRESS NOTES
[place] [facility] [place]  PROGRESS NOTE  Chief Complaint:   Patient presents with:  Medication Follow-Up      HPI:   This is a 69year old female coming in for follow-up on her medication.   She said that the last couple of weeks she has been feeling a - 450.0 10(3)uL    MCV 94.3 81.0 - 100.0 fL    MCH 29.2 27.0 - 33.2 pg    MCHC 31.0 31.0 - 37.0 g/dL    RDW 12.9 11.5 - 16.0 %    RDW-SD 44.4 35.1 - 46.3 fL    Neutrophil Absolute Prelim 4.13 1.30 - 6.70 x10 (3) uL    Neutrophil Absolute 4.13 1.30 - 6.70 x Oral Tab Take 2 tablets (50 mg total) by mouth 2 (two) times daily.  Disp: 360 tablet Rfl: 1   SYNTHROID 88 MCG Oral Tab TAKE ONE TABLET BY MOUTH ONE TIME DAILY  Disp: 90 tablet Rfl: 3   naproxen 250 MG Oral Tab Take 250 mg by mouth 2 (two) times daily with Pulse 80   Temp (!) 96.1 °F (35.6 °C) (Tympanic)   Resp 18   Ht 60\"   Wt 104 lb   BMI 20.31 kg/m²  Estimated body mass index is 20.31 kg/m² as calculated from the following:    Height as of this encounter: 60\". Weight as of this encounter: 104 lb. Problem List:     Anxiety state     Depression     Tear film insufficiency     Disturbance of salivary secretion     Eczema     Familial multiple lipoprotein-type hyperlipidemia     Hypothyroidism     Symptomatic menopausal or female climacteric states

## 2019-05-10 ENCOUNTER — TELEPHONE (OUTPATIENT)
Dept: FAMILY MEDICINE CLINIC | Facility: CLINIC | Age: 74
End: 2019-05-10

## 2019-05-10 NOTE — TELEPHONE ENCOUNTER
Please continue taking the same dose of the lamotrigine. The bupropion is in addition to the lamotrigine.

## 2019-05-10 NOTE — TELEPHONE ENCOUNTER
Asking if Patient is to continue Lamotrigine along with Bupriopion or is she to stop the Lamotrigine? Please advise.   Pooja Baker, 05/10/19, 4:37 PM

## 2019-05-10 NOTE — PROGRESS NOTES
[address]  PROGRESS NOTE  Chief Complaint:   Patient presents with: Anxiety: Everyday - more increasing depression/memory concerns      HPI:   This is a 69year old female coming in for continued anxiety.   She said she still feels very mg/dL   CBC W/ DIFFERENTIAL   Result Value Ref Range    WBC 6.7 4.0 - 13.0 x10(3) uL    RBC 4.18 3.80 - 5.10 x10(6)uL    HGB 12.2 12.0 - 16.0 g/dL    HCT 39.4 34.0 - 50.0 %    .0 150.0 - 450.0 10(3)uL    MCV 94.3 81.0 - 100.0 fL    MCH 29.2 27.0 - 3 reaction(s): rash itching  Sulfa Antibiotics         Venlafaxine             OTHER (SEE COMMENTS)    Comment:forgetfullness  Current Meds:    Current Outpatient Medications:  diphenhydrAMINE HCl 25 MG Oral Cap Take 25 mg by mouth every 6 (six) hours as nee extremities,change in bowel or bladder control. HEMATOLOGIC:  Denies anemia, bleeding or bruising. LYMPHATICS:  Denies enlarged nodes or history of splenectomy. PSYCHIATRIC: She is frustrated because she continues to have the anxiety.   ENDOCRINOLOGIC: Visit:  Requested Prescriptions     Signed Prescriptions Disp Refills   • buPROPion HCl ER, SR, 150 MG Oral Tablet 12 Hr 60 tablet 5     Sig: Take 1 tablet (150 mg total) by mouth 2 (two) times daily.        Health Maintenance:  Colonoscopy due on 03/14/194

## 2019-05-17 ENCOUNTER — TELEPHONE (OUTPATIENT)
Dept: FAMILY MEDICINE CLINIC | Facility: CLINIC | Age: 74
End: 2019-05-17

## 2019-05-17 NOTE — TELEPHONE ENCOUNTER
Patient states she stopped her new rx due to feeling confused/crying/anxious. Requesting follow up with Dr. Claire Oden. Appt given Monday 3pm.    Brook PAL, 05/17/19, 10:24 AM    Future appt:     Your appointments     Date & Time Appointment Department (C

## 2019-05-20 NOTE — PROGRESS NOTES
[address]  PROGRESS NOTE  Chief Complaint:   Patient presents with: Follow - Up: Medication      HPI:   This is a 69year old female coming in for follow-up on her medication.   The family reports that she became very confused when she 12.0 - 16.0 g/dL    HCT 39.4 34.0 - 50.0 %    .0 150.0 - 450.0 10(3)uL    MCV 94.3 81.0 - 100.0 fL    MCH 29.2 27.0 - 33.2 pg    MCHC 31.0 31.0 - 37.0 g/dL    RDW 12.9 11.5 - 16.0 %    RDW-SD 44.4 35.1 - 46.3 fL    Neutrophil Absolute Prelim 4.13 1. COMMENTS)    Comment:forgetfullness  Current Meds:    Current Outpatient Medications:  diphenhydrAMINE HCl 25 MG Oral Cap Take 25 mg by mouth every 6 (six) hours as needed for Itching, Allergies or Sleep.  Disp:  Rfl:    lamoTRIgine 25 MG Oral Tab Take 2 ta sweating, cold or heat intolerance, polyuria or polydipsia. ALLERGIES:  Denies allergic response, history of asthma, sneezing, hives, eczema or rhinitis.      EXAM:   /80 (BP Location: Right arm, Patient Position: Sitting, Cuff Size: adult)   Pulse 9 deal of trouble for her. I do not recommend adding another antidepressant now. Stop the bupropion. Continue lamotrigine 50 mg twice a day. Recheck in 2 months. She will look for services available through the senior center.   They will consider pascual

## 2019-06-14 ENCOUNTER — TELEPHONE (OUTPATIENT)
Dept: FAMILY MEDICINE CLINIC | Facility: CLINIC | Age: 74
End: 2019-06-14

## 2019-06-14 NOTE — TELEPHONE ENCOUNTER
Patient states she has stopped all Anti Depressants. Patient states She wakes up in the middle of the night crying. States she cries during the day. Patient/Spouse wanting patient to see Psychiatrist.  They will be calling Elvis for Appt.     Rand Patino

## 2019-06-14 NOTE — TELEPHONE ENCOUNTER
Patient states that she used to be on depression pills but was taken off of those, patient states that she is feeling depressed again. Would like a call back.

## 2019-06-27 ENCOUNTER — LAB ENCOUNTER (OUTPATIENT)
Dept: LAB | Age: 74
End: 2019-06-27
Attending: FAMILY MEDICINE
Payer: MEDICARE

## 2019-06-27 DIAGNOSIS — E03.8 OTHER SPECIFIED HYPOTHYROIDISM: ICD-10-CM

## 2019-06-27 DIAGNOSIS — K59.1 FUNCTIONAL DIARRHEA: ICD-10-CM

## 2019-06-27 PROCEDURE — 36415 COLL VENOUS BLD VENIPUNCTURE: CPT

## 2019-06-27 PROCEDURE — 84443 ASSAY THYROID STIM HORMONE: CPT

## 2019-06-27 PROCEDURE — 80053 COMPREHEN METABOLIC PANEL: CPT

## 2019-06-27 PROCEDURE — 85025 COMPLETE CBC W/AUTO DIFF WBC: CPT

## 2019-06-27 NOTE — PATIENT INSTRUCTIONS
Trial of tums 2-3 times per day. Stop ibuprofen  Stool for blood test,   Await labs. Trial of probiotic.

## 2019-06-27 NOTE — PROGRESS NOTES
[facility] [place]  PROGRESS NOTE        HPI:   This is a 69year old female coming in for Patient presents with:  Depression    HPI her intestines are bothering her and have been giving her a lot of trouble for the past couple weeks.   She has n CBC W/ DIFFERENTIAL   Result Value Ref Range    WBC 6.7 4.0 - 13.0 x10(3) uL    RBC 4.18 3.80 - 5.10 x10(6)uL    HGB 12.2 12.0 - 16.0 g/dL    HCT 39.4 34.0 - 50.0 %    .0 150.0 - 450.0 10(3)uL    MCV 94.3 81.0 - 100.0 fL    MCH 29.2 27.0 - 33.2 pg feel \"crazy'  Amoxicillin-Pot Cla*        Comment:Other reaction(s): rash itching  Bupropion               CONFUSION  Sulfa Antibiotics         Venlafaxine             OTHER (SEE COMMENTS)    Comment:forgetfullness  Current Meds:    Current Outpatient Med Encounters:  06/27/19 : 98 lb 3.2 oz  05/20/19 : 100 lb 6.4 oz  05/10/19 : 101 lb 8 oz  04/19/19 : 104 lb  03/12/19 : 103 lb  02/12/19 : 102 lb 8 oz     BP Readings from Last 3 Encounters:  06/27/19 : 136/86  05/20/19 : 140/80  05/10/19 : 136/86       [place] Appointment Department [facility])    Jul 23, 2019  9:00 AM CDT Exam - Established Patient with Abbe Dong MD [address] [facility] ([facility])            [address],  [address] [place]

## 2019-06-28 ENCOUNTER — TELEPHONE (OUTPATIENT)
Dept: FAMILY MEDICINE CLINIC | Facility: CLINIC | Age: 74
End: 2019-06-28

## 2019-06-28 NOTE — TELEPHONE ENCOUNTER
----- Message from Jose Menjivar MD sent at 6/28/2019  8:29 AM CDT -----  Labs look ok. Follow up with Dr. Randall Bower. Await stool cultures.

## 2019-06-28 NOTE — TELEPHONE ENCOUNTER
Pt informed of below result/recommendation. Pt verbalized understanding. No other questions at this time.

## 2019-07-01 ENCOUNTER — TELEPHONE (OUTPATIENT)
Dept: FAMILY MEDICINE CLINIC | Facility: CLINIC | Age: 74
End: 2019-07-01

## 2019-07-01 NOTE — TELEPHONE ENCOUNTER
Pharmacist asking if Generic Synthroid Ok to give? Informed they will need to ask Patient/Spouse if ok to substitute. Unsure if patient requested name brand due to the way it works. She will contact patient.   Lisa Menard, 07/01/19, 1:49 PM

## 2019-07-05 ENCOUNTER — TELEPHONE (OUTPATIENT)
Dept: FAMILY MEDICINE CLINIC | Facility: CLINIC | Age: 74
End: 2019-07-05

## 2019-07-05 NOTE — TELEPHONE ENCOUNTER
Medical Records request was received from [facility] requesting us to obtain & disclose medical records. Request sent to [employer].

## 2019-07-23 ENCOUNTER — OFFICE VISIT (OUTPATIENT)
Dept: FAMILY MEDICINE CLINIC | Facility: CLINIC | Age: 74
End: 2019-07-23
Payer: MEDICARE

## 2019-07-23 VITALS
SYSTOLIC BLOOD PRESSURE: 132 MMHG | HEIGHT: 59 IN | TEMPERATURE: 96 F | HEART RATE: 92 BPM | RESPIRATION RATE: 18 BRPM | BODY MASS INDEX: 19.15 KG/M2 | WEIGHT: 95 LBS | DIASTOLIC BLOOD PRESSURE: 86 MMHG

## 2019-07-23 DIAGNOSIS — E03.9 ACQUIRED HYPOTHYROIDISM: ICD-10-CM

## 2019-07-23 DIAGNOSIS — F41.1 GAD (GENERALIZED ANXIETY DISORDER): ICD-10-CM

## 2019-07-23 DIAGNOSIS — K59.1 FUNCTIONAL DIARRHEA: Primary | ICD-10-CM

## 2019-07-23 PROCEDURE — 99214 OFFICE O/P EST MOD 30 MIN: CPT | Performed by: FAMILY MEDICINE

## 2019-07-23 RX ORDER — LOPERAMIDE HYDROCHLORIDE 2 MG/1
2 CAPSULE ORAL 4 TIMES DAILY PRN
COMMUNITY

## 2019-07-23 NOTE — PROGRESS NOTES
[address]  PROGRESS NOTE  Chief Complaint:   Patient presents with: Follow - Up      HPI:   This is a 69year old female coming in for a follow-up visit. She said that she often awakens during the night crying.   She is able to go ba 4.1 3.4 - 5.0 g/dL    Globulin  3.7 2.8 - 4.4 g/dL    A/G Ratio 1.1 1.0 - 2.0   TSH W REFLEX TO FREE T4   Result Value Ref Range    TSH 0.875 0.358 - 3.740 mIU/mL   CBC W/ DIFFERENTIAL   Result Value Ref Range    WBC 6.7 4.0 - 11.0 x10(3) uL    RBC 4.50 3. Comment:Crying/Lashing Out  Iodine (Topical)        OTHER (SEE COMMENTS)    Comment: At stress test - made pt feel \"crazy'  Amoxicillin-Pot Cla*        Comment:Other reaction(s): rash itching  Bupropion               CONFUSION  Sulfa Antibiotics         Ve extremities,change in bowel or bladder control. HEMATOLOGIC:  Denies anemia, bleeding or bruising. LYMPHATICS:  Denies enlarged nodes or history of splenectomy. PSYCHIATRIC:  Denies depression or anxiety.   ENDOCRINOLOGIC:  Denies excessive sweating, col disorder)  She continues with generalized anxiety disorder. The lamotrigine has been helpful but it clearly is not adequate for controlling her symptoms alone. Plan: Please see Dr. Evelin Pemberton for further treatment.   In the past, she has taken bupropion, escit

## 2019-08-23 ENCOUNTER — TELEPHONE (OUTPATIENT)
Dept: FAMILY MEDICINE CLINIC | Facility: CLINIC | Age: 74
End: 2019-08-23

## 2019-08-23 NOTE — TELEPHONE ENCOUNTER
Patient states he's been having intestinal problems, wants appointment with Dr Doretha Miner- no openings until 9/17. Patient doesn't want to wait that long, would like a call back this afternoon.

## 2019-08-23 NOTE — TELEPHONE ENCOUNTER
Patient has been having loose stools especially in the morning for quite sometime. Would like to have evaluated. Appt given Monday 8/26 9:30 with  Roscoe Davila, 08/23/19, 1:46 PM    Future appt:     Your appointments     Date & Time Appointmen

## 2019-08-26 ENCOUNTER — APPOINTMENT (OUTPATIENT)
Dept: LAB | Age: 74
End: 2019-08-26
Attending: NURSE PRACTITIONER
Payer: MEDICARE

## 2019-08-26 ENCOUNTER — OFFICE VISIT (OUTPATIENT)
Dept: FAMILY MEDICINE CLINIC | Facility: CLINIC | Age: 74
End: 2019-08-26
Payer: MEDICARE

## 2019-08-26 VITALS
WEIGHT: 94.25 LBS | SYSTOLIC BLOOD PRESSURE: 124 MMHG | DIASTOLIC BLOOD PRESSURE: 82 MMHG | TEMPERATURE: 97 F | HEIGHT: 59 IN | BODY MASS INDEX: 19 KG/M2 | HEART RATE: 108 BPM | RESPIRATION RATE: 14 BRPM

## 2019-08-26 DIAGNOSIS — R19.4 CHANGE IN BOWEL HABITS: Primary | ICD-10-CM

## 2019-08-26 DIAGNOSIS — F34.1 DYSTHYMIA: ICD-10-CM

## 2019-08-26 DIAGNOSIS — R19.5 LOOSE STOOLS: ICD-10-CM

## 2019-08-26 PROBLEM — F33.1 MODERATE EPISODE OF RECURRENT MAJOR DEPRESSIVE DISORDER (HCC): Status: ACTIVE | Noted: 2019-07-25

## 2019-08-26 LAB
ALBUMIN SERPL-MCNC: 3.8 G/DL (ref 3.4–5)
ALBUMIN/GLOB SERPL: 1 {RATIO} (ref 1–2)
ALP LIVER SERPL-CCNC: 53 U/L (ref 55–142)
ALT SERPL-CCNC: 21 U/L (ref 13–56)
ANION GAP SERPL CALC-SCNC: 6 MMOL/L (ref 0–18)
AST SERPL-CCNC: 15 U/L (ref 15–37)
BASOPHILS # BLD AUTO: 0.04 X10(3) UL (ref 0–0.2)
BASOPHILS NFR BLD AUTO: 0.7 %
BILIRUB SERPL-MCNC: 0.3 MG/DL (ref 0.1–2)
BUN BLD-MCNC: 17 MG/DL (ref 7–18)
BUN/CREAT SERPL: 15.9 (ref 10–20)
CALCIUM BLD-MCNC: 9.2 MG/DL (ref 8.5–10.1)
CHLORIDE SERPL-SCNC: 108 MMOL/L (ref 98–112)
CO2 SERPL-SCNC: 29 MMOL/L (ref 21–32)
CREAT BLD-MCNC: 1.07 MG/DL (ref 0.55–1.02)
DEPRECATED RDW RBC AUTO: 41.6 FL (ref 35.1–46.3)
EOSINOPHIL # BLD AUTO: 0.28 X10(3) UL (ref 0–0.7)
EOSINOPHIL NFR BLD AUTO: 5.2 %
ERYTHROCYTE [DISTWIDTH] IN BLOOD BY AUTOMATED COUNT: 11.9 % (ref 11–15)
GLOBULIN PLAS-MCNC: 3.8 G/DL (ref 2.8–4.4)
GLUCOSE BLD-MCNC: 95 MG/DL (ref 70–99)
HAV IGM SER QL: 2.3 MG/DL (ref 1.6–2.6)
HCT VFR BLD AUTO: 40.1 % (ref 35–48)
HGB BLD-MCNC: 12.8 G/DL (ref 12–16)
IMM GRANULOCYTES # BLD AUTO: 0.01 X10(3) UL (ref 0–1)
IMM GRANULOCYTES NFR BLD: 0.2 %
LYMPHOCYTES # BLD AUTO: 1.47 X10(3) UL (ref 1–4)
LYMPHOCYTES NFR BLD AUTO: 27.1 %
M PROTEIN MFR SERPL ELPH: 7.6 G/DL (ref 6.4–8.2)
MCH RBC QN AUTO: 30 PG (ref 26–34)
MCHC RBC AUTO-ENTMCNC: 31.9 G/DL (ref 31–37)
MCV RBC AUTO: 94.1 FL (ref 80–100)
MONOCYTES # BLD AUTO: 0.42 X10(3) UL (ref 0.1–1)
MONOCYTES NFR BLD AUTO: 7.7 %
NEUTROPHILS # BLD AUTO: 3.2 X10 (3) UL (ref 1.5–7.7)
NEUTROPHILS # BLD AUTO: 3.2 X10(3) UL (ref 1.5–7.7)
NEUTROPHILS NFR BLD AUTO: 59.1 %
OSMOLALITY SERPL CALC.SUM OF ELEC: 297 MOSM/KG (ref 275–295)
PLATELET # BLD AUTO: 334 10(3)UL (ref 150–450)
POTASSIUM SERPL-SCNC: 3.7 MMOL/L (ref 3.5–5.1)
RBC # BLD AUTO: 4.26 X10(6)UL (ref 3.8–5.3)
SODIUM SERPL-SCNC: 143 MMOL/L (ref 136–145)
T4 FREE SERPL-MCNC: 1.1 NG/DL (ref 0.8–1.7)
TSI SER-ACNC: 0.07 MIU/ML (ref 0.36–3.74)
WBC # BLD AUTO: 5.4 X10(3) UL (ref 4–11)

## 2019-08-26 PROCEDURE — 85025 COMPLETE CBC W/AUTO DIFF WBC: CPT | Performed by: NURSE PRACTITIONER

## 2019-08-26 PROCEDURE — 80053 COMPREHEN METABOLIC PANEL: CPT | Performed by: NURSE PRACTITIONER

## 2019-08-26 PROCEDURE — 84443 ASSAY THYROID STIM HORMONE: CPT | Performed by: NURSE PRACTITIONER

## 2019-08-26 PROCEDURE — 99214 OFFICE O/P EST MOD 30 MIN: CPT | Performed by: NURSE PRACTITIONER

## 2019-08-26 PROCEDURE — 84439 ASSAY OF FREE THYROXINE: CPT | Performed by: NURSE PRACTITIONER

## 2019-08-26 PROCEDURE — 36415 COLL VENOUS BLD VENIPUNCTURE: CPT | Performed by: NURSE PRACTITIONER

## 2019-08-26 PROCEDURE — 83735 ASSAY OF MAGNESIUM: CPT | Performed by: NURSE PRACTITIONER

## 2019-08-26 NOTE — PROGRESS NOTES
Reena Betancourt is a 69year old female.   Patient presents with:  Diarrhea      HPI:   Complaints of loose stools for a couple of months -   Usually in the morning-     Wakes up crying lately -   Has been more sad- does see Dr. Syeda Cornelius for depression- vivienne Take 2 tablets by mouth daily.    Disp:  Rfl:       Past Medical History:   Diagnosis Date   • Anxiety    • Anxiety state 12/6/2016   • Depression    • Familial multiple lipoprotein-type hyperlipidemia 1/12/2010   • Hyperlipidemia    • Hypothyroidism    • S normal rate without respiratory distress, lungs clear to auscultation  CARDIO: RRR without murmur, no edema  GI: normal bowel sounds, no masses, no hepatosplenomegaly, or tenderness  MUSCULOSKELETAL: no edema, normal strength and tone  PSYCH: alert and shannon

## 2019-08-27 ENCOUNTER — TELEPHONE (OUTPATIENT)
Dept: FAMILY MEDICINE CLINIC | Facility: CLINIC | Age: 74
End: 2019-08-27

## 2019-08-27 NOTE — TELEPHONE ENCOUNTER
Informed patient of below results - patient is ok to take generic. Pt verbalized and expressed understanding.

## 2019-08-27 NOTE — TELEPHONE ENCOUNTER
----- Message from IMELDA Bryant sent at 8/27/2019  1:26 PM CDT -----  Please notify patient that her TSH is a little low which means that her Synthroid dose might be too high.   According to the chart patient is currently taking 80 mcg– would recom

## 2019-09-16 NOTE — PATIENT INSTRUCTIONS
Two suggestions for the diarrhea: Take Metamucil 1 tsp daily in thick juice. Take Florastor (Probiotic) twice a day for at least a month. Trial of Boost or Ensure daily.

## 2019-09-16 NOTE — PROGRESS NOTES
[facility] [place]  PROGRESS NOTE  Chief Complaint:   Patient presents with:  Arm Pain  Abdominal Pain: Cramping/Diarrhea  Weight Loss      HPI:   This is a 69year old female coming in for multiple concerns.     First, she has pain in her right U/L    Alkaline Phosphatase 53 (L) 55 - 142 U/L    Bilirubin, Total 0.3 0.1 - 2.0 mg/dL    Total Protein 7.6 6.4 - 8.2 g/dL    Albumin 3.8 3.4 - 5.0 g/dL    Globulin  3.8 2.8 - 4.4 g/dL    A/G Ratio 1.0 1.0 - 2.0   CBC W/ DIFFERENTIAL   Result Value Ref Ra Allergies:    Escitalopram            OTHER (SEE COMMENTS)    Comment:Crying/Lashing Out  Iodine (Topical)        OTHER (SEE COMMENTS)    Comment: At stress test - made pt feel \"crazy'  Amoxicillin-Pot Cla*        Comment:Other reaction(s): rash itchin stiffness. NEUROLOGICAL:  Denies headache, seizures, dizziness, syncope, paralysis, ataxia, numbness or tingling in the extremities,change in bowel or bladder control. HEMATOLOGIC:  Denies anemia, bleeding or bruising.   LYMPHATICS:  Denies enlarged nodes nontender, bowel sounds normal in all 4 quadrants, no masses, no hepatosplenomegaly. ASSESSMENT AND PLAN:   1. TOMASA (generalized anxiety disorder)  Her generalized anxiety is getting progressively worse in spite of our therapy.   Many of the symptoms th from the treatments as a result of today.      Problem List:  Patient Active Problem List:     Anxiety state     Depression     Tear film insufficiency     Disturbance of salivary secretion     Eczema     Familial multiple lipoprotein-type hyperlipidemia

## 2019-10-10 ENCOUNTER — TELEPHONE (OUTPATIENT)
Dept: FAMILY MEDICINE CLINIC | Facility: CLINIC | Age: 74
End: 2019-10-10

## 2019-10-10 NOTE — TELEPHONE ENCOUNTER
Patient scheduled for lab draw on 10/10/19, but no showed. I called patient and she forgot all about the appt. She is scheduled to come in on 10/15/19 to see Dr Zilphia Krabbe and will just have the BW done on this date.

## 2019-10-15 ENCOUNTER — OFFICE VISIT (OUTPATIENT)
Dept: FAMILY MEDICINE CLINIC | Facility: CLINIC | Age: 74
End: 2019-10-15
Payer: MEDICARE

## 2019-10-15 VITALS
WEIGHT: 93.38 LBS | TEMPERATURE: 98 F | BODY MASS INDEX: 18.83 KG/M2 | SYSTOLIC BLOOD PRESSURE: 130 MMHG | RESPIRATION RATE: 16 BRPM | OXYGEN SATURATION: 97 % | HEART RATE: 86 BPM | HEIGHT: 59 IN | DIASTOLIC BLOOD PRESSURE: 90 MMHG

## 2019-10-15 DIAGNOSIS — M16.0 PRIMARY OSTEOARTHRITIS OF BOTH HIPS: ICD-10-CM

## 2019-10-15 DIAGNOSIS — Z23 FLU VACCINE NEED: ICD-10-CM

## 2019-10-15 DIAGNOSIS — R41.3 MEMORY LOSS: ICD-10-CM

## 2019-10-15 DIAGNOSIS — K59.1 FUNCTIONAL DIARRHEA: ICD-10-CM

## 2019-10-15 DIAGNOSIS — Z00.00 ENCOUNTER FOR ANNUAL HEALTH EXAMINATION: Primary | ICD-10-CM

## 2019-10-15 DIAGNOSIS — Z11.59 NEED FOR HEPATITIS C SCREENING TEST: ICD-10-CM

## 2019-10-15 DIAGNOSIS — E03.9 ACQUIRED HYPOTHYROIDISM: ICD-10-CM

## 2019-10-15 DIAGNOSIS — Z12.31 VISIT FOR SCREENING MAMMOGRAM: ICD-10-CM

## 2019-10-15 DIAGNOSIS — F41.1 GAD (GENERALIZED ANXIETY DISORDER): ICD-10-CM

## 2019-10-15 DIAGNOSIS — G43.909 MIGRAINE WITHOUT STATUS MIGRAINOSUS, NOT INTRACTABLE, UNSPECIFIED MIGRAINE TYPE: ICD-10-CM

## 2019-10-15 DIAGNOSIS — M35.01 SJOGREN'S SYNDROME WITH KERATOCONJUNCTIVITIS SICCA (HCC): ICD-10-CM

## 2019-10-15 DIAGNOSIS — E78.49 FAMILIAL MULTIPLE LIPOPROTEIN-TYPE HYPERLIPIDEMIA: ICD-10-CM

## 2019-10-15 DIAGNOSIS — M81.0 AGE-RELATED OSTEOPOROSIS WITHOUT CURRENT PATHOLOGICAL FRACTURE: ICD-10-CM

## 2019-10-15 DIAGNOSIS — F33.1 MODERATE EPISODE OF RECURRENT MAJOR DEPRESSIVE DISORDER (HCC): ICD-10-CM

## 2019-10-15 PROCEDURE — G0008 ADMIN INFLUENZA VIRUS VAC: HCPCS | Performed by: FAMILY MEDICINE

## 2019-10-15 PROCEDURE — 90662 IIV NO PRSV INCREASED AG IM: CPT | Performed by: FAMILY MEDICINE

## 2019-10-15 PROCEDURE — 99213 OFFICE O/P EST LOW 20 MIN: CPT | Performed by: FAMILY MEDICINE

## 2019-10-15 PROCEDURE — G0439 PPPS, SUBSEQ VISIT: HCPCS | Performed by: FAMILY MEDICINE

## 2019-10-15 NOTE — PATIENT INSTRUCTIONS
Check labs today. Influenza vaccine today. Continue the same medications. Recheck in 3 months. Please schedule mammogram.            Recommended Websites for Advanced Directives    [email or web address]  An inf

## 2019-10-15 NOTE — PROGRESS NOTES
[facility] [place]  PROGRESS NOTE  Chief Complaint:   Patient presents with:  Physical      HPI:   This is a 69year old female coming in for her annual Medicare wellness exam.  She is frustrated because she feels like her memory is getting wors Non- 52 (L) >=60    GFR, -American 60 >=60    AST 15 15 - 37 U/L    ALT 21 13 - 56 U/L    Alkaline Phosphatase 53 (L) 55 - 142 U/L    Bilirubin, Total 0.3 0.1 - 2.0 mg/dL    Total Protein 7.6 6.4 - 8.2 g/dL    Albumin 3.8 3.4 - 5.0 g No    Family History:  Family History   Problem Relation Age of Onset   • Other (Other) Mother      Allergies:    Escitalopram            OTHER (SEE COMMENTS)    Comment:Crying/Lashing Out  Iodine (Topical)        OTHER (SEE COMMENTS)    Comment: At stress improved a great deal.  She said she no longer has the uncontrollable loose stools. MUSCULOSKELETAL: He does still have some discomfort in her right hip.   NEUROLOGICAL:  Denies headache, seizures, dizziness, syncope, paralysis, ataxia, numbness or tinglin hepatosplenomegaly. EXTREMITIES:  No edema, no cyanosis, no clubbing, FROM, 2+ dorsalis pedis pulses bilaterally. NEURO:  No deficit, normal gait, strength and tone, sensory intact, normal reflexes. ASSESSMENT AND PLAN:   1.  Encounter for annual healt with a normal T4. Plan: Please check labs today.  - OFFICE/OUTPT VISIT,CHAN LINDSEY III    11. Memory loss  Her memory loss is slowly progressive. Unfortunately, she does meet the characteristics of Alzheimer's disease with associated anxiety. Plan:  At this Memory loss     TOMASA (generalized anxiety disorder)     Functional diarrhea     Moderate episode of [facility]      Laura Vizcaino MD  10/15/2019  12:50 PM

## 2019-11-18 RX ORDER — LEVOTHYROXINE SODIUM 0.07 MG/1
TABLET ORAL
Qty: 90 TABLET | Refills: 1 | Status: SHIPPED | OUTPATIENT
Start: 2019-11-18 | End: 2020-05-19

## 2020-02-26 ENCOUNTER — TELEPHONE (OUTPATIENT)
Dept: FAMILY MEDICINE CLINIC | Facility: CLINIC | Age: 75
End: 2020-02-26

## 2020-02-26 NOTE — TELEPHONE ENCOUNTER
Patient walk in. Patient states she has been shaky all day. Was supposed to have had an appointment at   [facility], but had to leave because she  Was shaking and became nauseated. On their way home and stopped at [facility]. Patient in waking room.   Franco

## 2020-02-27 ENCOUNTER — APPOINTMENT (OUTPATIENT)
Dept: LAB | Age: 75
End: 2020-02-27
Attending: NURSE PRACTITIONER
Payer: MEDICARE

## 2020-02-27 ENCOUNTER — OFFICE VISIT (OUTPATIENT)
Dept: FAMILY MEDICINE CLINIC | Facility: CLINIC | Age: 75
End: 2020-02-27
Payer: MEDICARE

## 2020-02-27 VITALS
HEIGHT: 59 IN | RESPIRATION RATE: 18 BRPM | SYSTOLIC BLOOD PRESSURE: 122 MMHG | OXYGEN SATURATION: 99 % | DIASTOLIC BLOOD PRESSURE: 80 MMHG | BODY MASS INDEX: 16.93 KG/M2 | HEART RATE: 99 BPM | TEMPERATURE: 98 F | WEIGHT: 84 LBS

## 2020-02-27 DIAGNOSIS — E87.6 HYPOKALEMIA: ICD-10-CM

## 2020-02-27 DIAGNOSIS — R63.4 WEIGHT LOSS: Primary | ICD-10-CM

## 2020-02-27 DIAGNOSIS — F34.1 DYSTHYMIA: ICD-10-CM

## 2020-02-27 DIAGNOSIS — F41.1 ANXIETY STATE: ICD-10-CM

## 2020-02-27 DIAGNOSIS — R73.9 HYPERGLYCEMIA: ICD-10-CM

## 2020-02-27 LAB
ALBUMIN SERPL-MCNC: 3.6 G/DL (ref 3.4–5)
ALBUMIN/GLOB SERPL: 1 {RATIO} (ref 1–2)
ALP LIVER SERPL-CCNC: 46 U/L (ref 55–142)
ALT SERPL-CCNC: 19 U/L (ref 13–56)
AMYLASE SERPL-CCNC: 32 U/L (ref 25–115)
ANION GAP SERPL CALC-SCNC: 5 MMOL/L (ref 0–18)
AST SERPL-CCNC: 17 U/L (ref 15–37)
BASOPHILS # BLD AUTO: 0.06 X10(3) UL (ref 0–0.2)
BASOPHILS NFR BLD AUTO: 1 %
BILIRUB SERPL-MCNC: 0.5 MG/DL (ref 0.1–2)
BUN BLD-MCNC: 13 MG/DL (ref 7–18)
BUN/CREAT SERPL: 14.4 (ref 10–20)
CALCIUM BLD-MCNC: 9 MG/DL (ref 8.5–10.1)
CHLORIDE SERPL-SCNC: 106 MMOL/L (ref 98–112)
CO2 SERPL-SCNC: 28 MMOL/L (ref 21–32)
CREAT BLD-MCNC: 0.9 MG/DL (ref 0.55–1.02)
DEPRECATED RDW RBC AUTO: 41.1 FL (ref 35.1–46.3)
EOSINOPHIL # BLD AUTO: 0.18 X10(3) UL (ref 0–0.7)
EOSINOPHIL NFR BLD AUTO: 3.1 %
ERYTHROCYTE [DISTWIDTH] IN BLOOD BY AUTOMATED COUNT: 12.1 % (ref 11–15)
EST. AVERAGE GLUCOSE BLD GHB EST-MCNC: 108 MG/DL (ref 68–126)
GLOBULIN PLAS-MCNC: 3.7 G/DL (ref 2.8–4.4)
GLUCOSE BLD-MCNC: 93 MG/DL (ref 70–99)
HBA1C MFR BLD HPLC: 5.4 % (ref ?–5.7)
HCT VFR BLD AUTO: 37.8 % (ref 35–48)
HGB BLD-MCNC: 12.6 G/DL (ref 12–16)
IMM GRANULOCYTES # BLD AUTO: 0.01 X10(3) UL (ref 0–1)
IMM GRANULOCYTES NFR BLD: 0.2 %
LIPASE SERPL-CCNC: 131 U/L (ref 73–393)
LYMPHOCYTES # BLD AUTO: 1.6 X10(3) UL (ref 1–4)
LYMPHOCYTES NFR BLD AUTO: 27.2 %
M PROTEIN MFR SERPL ELPH: 7.3 G/DL (ref 6.4–8.2)
MCH RBC QN AUTO: 31 PG (ref 26–34)
MCHC RBC AUTO-ENTMCNC: 33.3 G/DL (ref 31–37)
MCV RBC AUTO: 92.9 FL (ref 80–100)
MONOCYTES # BLD AUTO: 0.46 X10(3) UL (ref 0.1–1)
MONOCYTES NFR BLD AUTO: 7.8 %
NEUTROPHILS # BLD AUTO: 3.58 X10 (3) UL (ref 1.5–7.7)
NEUTROPHILS # BLD AUTO: 3.58 X10(3) UL (ref 1.5–7.7)
NEUTROPHILS NFR BLD AUTO: 60.7 %
OSMOLALITY SERPL CALC.SUM OF ELEC: 288 MOSM/KG (ref 275–295)
PATIENT FASTING Y/N/NP: NO
PLATELET # BLD AUTO: 273 10(3)UL (ref 150–450)
POTASSIUM SERPL-SCNC: 3.9 MMOL/L (ref 3.5–5.1)
RBC # BLD AUTO: 4.07 X10(6)UL (ref 3.8–5.3)
SODIUM SERPL-SCNC: 139 MMOL/L (ref 136–145)
T4 FREE SERPL-MCNC: 1.2 NG/DL (ref 0.8–1.7)
TSI SER-ACNC: 4.1 MIU/ML (ref 0.36–3.74)
WBC # BLD AUTO: 5.9 X10(3) UL (ref 4–11)

## 2020-02-27 PROCEDURE — 85025 COMPLETE CBC W/AUTO DIFF WBC: CPT | Performed by: NURSE PRACTITIONER

## 2020-02-27 PROCEDURE — 36415 COLL VENOUS BLD VENIPUNCTURE: CPT | Performed by: NURSE PRACTITIONER

## 2020-02-27 PROCEDURE — 84443 ASSAY THYROID STIM HORMONE: CPT | Performed by: NURSE PRACTITIONER

## 2020-02-27 PROCEDURE — 83690 ASSAY OF LIPASE: CPT | Performed by: NURSE PRACTITIONER

## 2020-02-27 PROCEDURE — 82150 ASSAY OF AMYLASE: CPT | Performed by: NURSE PRACTITIONER

## 2020-02-27 PROCEDURE — 80053 COMPREHEN METABOLIC PANEL: CPT | Performed by: NURSE PRACTITIONER

## 2020-02-27 PROCEDURE — 99215 OFFICE O/P EST HI 40 MIN: CPT | Performed by: NURSE PRACTITIONER

## 2020-02-27 PROCEDURE — 84439 ASSAY OF FREE THYROXINE: CPT | Performed by: NURSE PRACTITIONER

## 2020-02-27 PROCEDURE — 83036 HEMOGLOBIN GLYCOSYLATED A1C: CPT | Performed by: NURSE PRACTITIONER

## 2020-02-27 RX ORDER — DONEPEZIL HYDROCHLORIDE 5 MG/1
5 TABLET, FILM COATED ORAL 2 TIMES DAILY
COMMUNITY

## 2020-02-27 RX ORDER — FLUOXETINE HYDROCHLORIDE 20 MG/1
60 CAPSULE ORAL DAILY
COMMUNITY

## 2020-02-27 NOTE — PATIENT INSTRUCTIONS
Call Dr. De La Torre (neurologist)- and have him review the CT report- suspicious for normal pressure hydrocephalus. Check non fasting blood work today     follow up with Dr. Matthew Rosas on Monday as scheduled.      Try Ensure supplements -  Increase potassium rich cup diced, 300 mg to 400 mg*   Orange. Fresh 1 medium, 200 mg to 300 mg    Orange juice. Unsweetened, fresh or frozen 1/2 cup, 200 mg to 300 mg  Pineapple juice. Unsweetened 1 cup, 300 mg to 400 mg   Prune juice.  Unsweetened 1/2 cup, 300 mg to 400 mg*   Pr

## 2020-02-27 NOTE — PROGRESS NOTES
CHIEF COMPLAINT:   Patient presents with:  ER F/U: Potassium levels low  Weight Loss: not eating enough      HPI:   Dwayne Saenz is a 69year old female who presents to clinic today with complaints of not feeling well for months   States stomach bothe Start date: 1964        Quit date: 1967        Years since quittin.1      Smokeless tobacco: Never Used    Alcohol use:  Yes      Alcohol/week: 2.0 standard drinks      Types: 2 Glasses of wine per week    Drug use: No       REVIEW OF SYS Try Ensure supplements -  Increase potassium rich foods. Potassium-Rich Foods  The normal adult diet usually contains 2,000 mg to 4,000 mg of potassium per day. More potassium is needed when you lose too much potassium from your body.  This can happen Prune juice. Unsweetened 1/2 cup, 300 mg to 400 mg*   Prunes. Dried 5 pieces, 300 mg to 400 mg*   Strawberries. Fresh or frozen 1 cup, 200 mg to 300 mg  Meat  Red meat. Cooked 3 ounces, 100 mg to 300 mg   Seafood  Cod, flounder, halibut.  Cooked 3 ounces, 1

## 2020-02-28 ENCOUNTER — TELEPHONE (OUTPATIENT)
Dept: FAMILY MEDICINE CLINIC | Facility: CLINIC | Age: 75
End: 2020-02-28

## 2020-02-28 NOTE — TELEPHONE ENCOUNTER
----- Message from IMELDA Lyons sent at 2/28/2020 11:05 AM CST -----  Please notify patient/ that overall her blood work looks really good.   Her hemoglobin A1c is normal–indicating no diabetes–she is not anemic–her liver and kidney function

## 2020-02-28 NOTE — TELEPHONE ENCOUNTER
Patient's  Itz Jacobs informed of the below results and recommendations. States he will be calling the Neurologist this afternoon.

## 2020-03-02 PROBLEM — R63.4 WEIGHT LOSS: Status: ACTIVE | Noted: 2020-03-02

## 2020-03-02 NOTE — PROGRESS NOTES
[address]  PROGRESS NOTE  Chief Complaint:   Patient presents with: Follow - Up      HPI:   This is a 69year old female coming in for follow-up on her treatment. She saw Dr. Ezio Slaughter January 29 and was started on fluoxetine.   She has Average Glucose 108 68 - 126 mg/dL   TSH+FREE T4   Result Value Ref Range    Free T4 1.2 0.8 - 1.7 ng/dL    TSH 4.100 (H) 0.358 - 3.740 mIU/mL   LIPASE   Result Value Ref Range    Lipase 131 73 - 393 U/L   AMYLASE   Result Value Ref Range    Amylase 32 25 of Onset   • Other (Other) Mother      Allergies:    Escitalopram            OTHER (SEE COMMENTS)    Comment:Crying/Lashing Out  Iodine (Topical)        OTHER (SEE COMMENTS)    Comment: At stress test - made pt feel \"crazy'  Amoxicillin-Pot Cla*        Com sputum. GASTROINTESTINAL:  Denies abdominal pain, nausea, vomiting, constipation, diarrhea, or blood in stool. MUSCULOSKELETAL:  Denies weakness, muscle aches, back pain, joint pain, swelling or stiffness.   NEUROLOGICAL:  Denies headache, seizures, dizzi bowel sounds normal in all 4 quadrants, no masses, no hepatosplenomegaly. ASSESSMENT AND PLAN:   1. TOMASA (generalized anxiety disorder)  She continues with generalized anxiety but overall she is better than she had been previously.   The addition of flu disorder     Sjogren's disease ([facility] [address]     Primary osteoarthritis of both hips     Elevated blood pressure reading without diagnosis of hypertension     Body aches     Memory loss     TOMASA (generalized anxiety disorder)     Functional diarrhea     Moderate ep

## 2020-03-25 ENCOUNTER — TELEPHONE (OUTPATIENT)
Dept: FAMILY MEDICINE CLINIC | Facility: CLINIC | Age: 75
End: 2020-03-25

## 2020-03-25 DIAGNOSIS — G24.01 TARDIVE DYSKINESIA: Primary | ICD-10-CM

## 2020-03-25 PROCEDURE — G2012 BRIEF CHECK IN BY MD/QHP: HCPCS | Performed by: FAMILY MEDICINE

## 2020-03-25 NOTE — TELEPHONE ENCOUNTER
I spoke with Maria Elena Lobato and her  Robert Keyes. Over the past 7-10 days she has had increasing shakiness and trouble walking. She fell yesterday but did not have any severe injury. She has otherwise been feeling pretty good. She has not had a fever or chills.

## 2020-03-25 NOTE — TELEPHONE ENCOUNTER
Jersey Deng  verbally consents to a Virtual/Telephone Check-In service on 3/25/2020. Patient understands and accepts financial responsibility for any deductible, co-insurance and/or co-pays associated with this service. pt has been shaking more.

## 2020-04-16 ENCOUNTER — TELEPHONE (OUTPATIENT)
Dept: FAMILY MEDICINE CLINIC | Facility: CLINIC | Age: 75
End: 2020-04-16

## 2020-04-16 ENCOUNTER — OFFICE VISIT (OUTPATIENT)
Dept: FAMILY MEDICINE CLINIC | Facility: CLINIC | Age: 75
End: 2020-04-16
Payer: MEDICARE

## 2020-04-16 VITALS
WEIGHT: 78 LBS | DIASTOLIC BLOOD PRESSURE: 74 MMHG | HEART RATE: 89 BPM | OXYGEN SATURATION: 99 % | HEIGHT: 59 IN | TEMPERATURE: 98 F | RESPIRATION RATE: 18 BRPM | SYSTOLIC BLOOD PRESSURE: 118 MMHG | BODY MASS INDEX: 15.72 KG/M2

## 2020-04-16 DIAGNOSIS — R63.4 WEIGHT LOSS: ICD-10-CM

## 2020-04-16 DIAGNOSIS — K14.6 TONGUE SORE: Primary | ICD-10-CM

## 2020-04-16 DIAGNOSIS — R63.6 UNDERWEIGHT: ICD-10-CM

## 2020-04-16 PROCEDURE — 87077 CULTURE AEROBIC IDENTIFY: CPT | Performed by: NURSE PRACTITIONER

## 2020-04-16 PROCEDURE — 87205 SMEAR GRAM STAIN: CPT | Performed by: NURSE PRACTITIONER

## 2020-04-16 PROCEDURE — 99214 OFFICE O/P EST MOD 30 MIN: CPT | Performed by: NURSE PRACTITIONER

## 2020-04-16 PROCEDURE — 87070 CULTURE OTHR SPECIMN AEROBIC: CPT | Performed by: NURSE PRACTITIONER

## 2020-04-16 NOTE — TELEPHONE ENCOUNTER
spouse would like to talk to a nurse/ MA  about a sore in pt's mouth - wants to know what to do for this

## 2020-04-16 NOTE — TELEPHONE ENCOUNTER
Would recommend that patient sign up for my chart and do a video visit- if she is not able- then she can come in for an in person visit-  I really don't think I can tell her what is going on with a phone visit-  It really depends on what the sore looks lik

## 2020-04-16 NOTE — PROGRESS NOTES
CHIEF COMPLAINT:   Patient presents with:  Bump: tongue, left side x 2 weeks, painful, does not eat on that side      HPI:   Rubina Jasso is a 70year old female who presents to clinic today with complaints of bump to left side of tongue for a week- i Smoking status: Former Smoker        Packs/day: 0.50        Years: 2.00        Pack years: 1        Types: Cigarettes        Start date: 1964        Quit date: 1967        Years since quittin.3      Smokeless tobacco: Never Used    Alcohol Follow up if symptoms persist or increase - consider oral surgeon referral    Canker Sore    A canker sore (also called an aphthous ulcer) is a painful sore on the lining of the mouth.  It is most painful during the first few days, and it lasts about 7 to 1 · Don’t eat crusty or crunchy foods such as [place] bread or potato chips. These kinds of foods can hurt the inside of your mouth, or scrape your existing canker sores. Medicines  You can try over-the-counter medicines that cover the sores and numb them.  Samella Marrow Meds & Refills for this Visit:  Requested Prescriptions      No prescriptions requested or ordered in this encounter     I spent a total of 25 minutes face to face with patient - greater than 50 % of which was spent in counseling regarding weight loss- and

## 2020-04-16 NOTE — TELEPHONE ENCOUNTER
Spoke with patient's   - he states that patient didn't have a lot of information for him - was wondering what was recommended for her pain - also discussed with   To have patient stop ACT mouthwash - he states patient has not been using this-

## 2020-04-16 NOTE — TELEPHONE ENCOUNTER
Returned call and spoke with patient. Sore on tongue for 2 weeks. White flat \"spot\" on edge of left side of tongue towards the middle of tongue. She does not notice any redness. She states sore is painful.  She has been using Anbesol OTC 5-6x

## 2020-04-16 NOTE — PATIENT INSTRUCTIONS
Culture obtained- results will be back on Monday-   Follow up with dietitian -  Possibly via video or phone visit- recommend signing up for my chart.    Stop ACT mouthwash- use biotene   Follow up if symptoms persist or increase - consider oral surgeon refe · You may find that soft, easy-to-chew foods cause less pain. Use a straw to direct liquids away from the sore. · Use a soft-bristle toothbrush, and brush your teeth gently. · Don’t eat acidic, salty, or spicy foods.   · Don’t eat crusty or crunchy foods © 3632-2639 [employer] [address]. [address] [facility], [facility] [address] [place]. All rights reserved. This information is not intended as a substitute for professional medical care. Always follow your healthcare professional's instructions.

## 2020-04-17 ENCOUNTER — TELEPHONE (OUTPATIENT)
Dept: FAMILY MEDICINE CLINIC | Facility: CLINIC | Age: 75
End: 2020-04-17

## 2020-04-17 NOTE — TELEPHONE ENCOUNTER
Placed call to  (patient with dementia). Received a message stating \"The party you are trying to reach does not accept calls from numbers will caller id block. \"    As I am working from home today, will route message to [place] who is in the off

## 2020-04-17 NOTE — TELEPHONE ENCOUNTER
----- Message from IMELDA Cisse sent at 4/17/2020 12:24 PM CDT -----  Please notify patient/ that the preliminary culture of her mouth is just normal louisa- no thrush so far- we will let her know if anything shows up on the final report

## 2020-05-19 RX ORDER — LEVOTHYROXINE SODIUM 0.07 MG/1
TABLET ORAL
Qty: 90 TABLET | Refills: 1 | Status: SHIPPED | OUTPATIENT
Start: 2020-05-19 | End: 2020-11-20

## 2020-05-19 NOTE — TELEPHONE ENCOUNTER
Future appt:    Last Appointment with provider:   3/2/2020  Last appointment at [facility]:  4/16/2020  Last px: 10/15/2019    LEVOTHYROXINE SODIUM 75 MCG Oral Tab #90 with 1 refill, pt to take 1 tab po daily before breakfast, last refilled on 11/18/2019

## 2020-05-26 ENCOUNTER — TELEPHONE (OUTPATIENT)
Dept: FAMILY MEDICINE CLINIC | Facility: CLINIC | Age: 75
End: 2020-05-26

## 2020-05-26 DIAGNOSIS — R63.4 WEIGHT LOSS: Primary | ICD-10-CM

## 2020-05-26 NOTE — TELEPHONE ENCOUNTER
The name of the dietician you gave her, she missed place it. She needs it again  No future appointments.

## 2020-05-26 NOTE — TELEPHONE ENCOUNTER
There is not a specific name - call central scheduling at [phone]  - patient  Given number to call   IMELDA Moran

## 2020-06-02 ENCOUNTER — VIRTUAL PHONE E/M (OUTPATIENT)
Dept: NUTRITION | Facility: HOSPITAL | Age: 75
End: 2020-06-02
Attending: FAMILY MEDICINE
Payer: MEDICARE

## 2020-06-02 DIAGNOSIS — Z71.3 NUTRITIONAL COUNSELING: Primary | ICD-10-CM

## 2020-06-02 PROCEDURE — 97802 MEDICAL NUTRITION INDIV IN: CPT

## 2020-06-02 NOTE — PROGRESS NOTES
ADULT INITIAL OUTPATIENT NUTRITION CONSULTATION    *consultation completed via virtual phone visit d/t COVID pandemic*    Nutrition Assessment    Medical Diagnosis: recent wt loss    PMH: anxiety    Client Hx: 70year old female    Meds: noted    Labs: n/ 1x/wk. Written materials were issued and explained. All questions answered at this time. All information emailed to pt, including recipe ideas, breakfast, lunch, and snack ideas for weight gain, and list of high calorie/high protein foods to choose.  Pt agr

## 2020-08-13 ENCOUNTER — TELEPHONE (OUTPATIENT)
Dept: FAMILY MEDICINE CLINIC | Facility: CLINIC | Age: 75
End: 2020-08-13

## 2020-08-13 NOTE — TELEPHONE ENCOUNTER
Her thyroid medication was decreased, another doctor wants to know.   Future Appointments   Date Time Provider Guerita Verdin   10/16/2020 10:30 AM Brenda Pat MD [facility] [facility]

## 2020-08-14 NOTE — TELEPHONE ENCOUNTER
states Eun Johnson was asking why  Levothyroxine was decreased 8/2019? Informed that she was receiving to much  Thyroid medication per her lab results and  The Levothyroxine was reduced/agreed. He will inform .   Chong Sutherland, 08/14/20, 9:

## 2020-10-06 ENCOUNTER — TELEPHONE (OUTPATIENT)
Dept: FAMILY MEDICINE CLINIC | Facility: CLINIC | Age: 75
End: 2020-10-06

## 2020-10-06 DIAGNOSIS — E03.9 ACQUIRED HYPOTHYROIDISM: ICD-10-CM

## 2020-10-06 DIAGNOSIS — M81.0 AGE-RELATED OSTEOPOROSIS WITHOUT CURRENT PATHOLOGICAL FRACTURE: ICD-10-CM

## 2020-10-06 DIAGNOSIS — R63.4 WEIGHT LOSS: Primary | ICD-10-CM

## 2020-10-06 NOTE — TELEPHONE ENCOUNTER
Medicare px  on 12/1   ( rsd from 10/16 )  w/ FBW on 11/25   need BW orders please     OTW      Future Appointments   Date Time Provider Guerita Verdin   11/25/2020  9:30 AM [facility]F [place] REF [facility]   12/1/2020 10:00 AM Aamir Flores MD [facility]

## 2020-10-21 ENCOUNTER — HOSPITAL ENCOUNTER (OUTPATIENT)
Dept: GENERAL RADIOLOGY | Age: 75
Discharge: HOME | End: 2020-10-21
Attending: NURSE PRACTITIONER
Payer: MEDICARE

## 2020-10-21 ENCOUNTER — OFFICE VISIT (OUTPATIENT)
Dept: FAMILY MEDICINE CLINIC | Facility: CLINIC | Age: 75
End: 2020-10-21
Payer: MEDICARE

## 2020-10-21 VITALS
SYSTOLIC BLOOD PRESSURE: 172 MMHG | HEIGHT: 59 IN | RESPIRATION RATE: 20 BRPM | TEMPERATURE: 98 F | DIASTOLIC BLOOD PRESSURE: 68 MMHG | BODY MASS INDEX: 18.95 KG/M2 | HEART RATE: 96 BPM | WEIGHT: 94 LBS

## 2020-10-21 DIAGNOSIS — M25.559 HIP PAIN: ICD-10-CM

## 2020-10-21 DIAGNOSIS — M54.50 ACUTE RIGHT-SIDED LOW BACK PAIN WITHOUT SCIATICA: ICD-10-CM

## 2020-10-21 DIAGNOSIS — Z23 NEED FOR VACCINATION: ICD-10-CM

## 2020-10-21 DIAGNOSIS — M25.559 HIP PAIN: Primary | ICD-10-CM

## 2020-10-21 PROCEDURE — 73502 X-RAY EXAM HIP UNI 2-3 VIEWS: CPT | Performed by: NURSE PRACTITIONER

## 2020-10-21 PROCEDURE — 72110 X-RAY EXAM L-2 SPINE 4/>VWS: CPT | Performed by: NURSE PRACTITIONER

## 2020-10-21 PROCEDURE — 99214 OFFICE O/P EST MOD 30 MIN: CPT | Performed by: NURSE PRACTITIONER

## 2020-10-21 RX ORDER — FLUOXETINE 10 MG/1
1 CAPSULE ORAL DAILY
COMMUNITY
Start: 2020-07-15 | End: 2020-12-01 | Stop reason: ALTCHOICE

## 2020-10-21 RX ORDER — CLOTRIMAZOLE 10 MG/1
1 LOZENGE ORAL; TOPICAL AS DIRECTED
COMMUNITY
Start: 2020-08-27 | End: 2020-12-01 | Stop reason: ALTCHOICE

## 2020-10-21 RX ORDER — MEMANTINE HYDROCHLORIDE 10 MG/1
10 TABLET ORAL 2 TIMES DAILY
COMMUNITY
Start: 2020-09-30

## 2020-10-21 NOTE — PATIENT INSTRUCTIONS
X-rays show no acute fracture–some arthritis and disc degeneration to your back. Follow-up with physical therapy. Monitor your blood pressure at home–follow-up if it continues to be greater than 140/90    Flu shot today.     Follow-up for a bone densi

## 2020-10-21 NOTE — PROGRESS NOTES
Juan Fried is a 70year old female. Patient presents with:  Hip Pain: PT Referral      HPI:   Complaints of pain to right hip-  Would like a referral for her pain. No injury. Nothing increased pain-   Has tried Advil   Has been hurting.  For \" a needed. • Calcium Carbonate-Vitamin D (CALCIUM + D OR) Take 2 tablets by mouth daily.           Past Medical History:   Diagnosis Date   • Anxiety    • Anxiety state 12/6/2016   • Depression    • Familial multiple lipoprotein-type hyperlipidemia 1/12/ lungs clear to auscultation  CARDIO: RRR without murmur, no edema  MUSCULOSKELETAL: Right hip–no erythema, ecchymosis, edema–no bony tenderness to hip joint–some tenderness to sacroiliac joint/right buttock with palpation.   Full range of motion to hip–some

## 2020-10-23 PROCEDURE — 90662 IIV NO PRSV INCREASED AG IM: CPT | Performed by: NURSE PRACTITIONER

## 2020-10-23 PROCEDURE — G0008 ADMIN INFLUENZA VIRUS VAC: HCPCS | Performed by: NURSE PRACTITIONER

## 2020-11-20 RX ORDER — LEVOTHYROXINE SODIUM 0.07 MG/1
TABLET ORAL
Qty: 90 TABLET | Refills: 1 | Status: SHIPPED | OUTPATIENT
Start: 2020-11-20 | End: 2021-06-01

## 2020-11-20 NOTE — TELEPHONE ENCOUNTER
Future appt:     Your appointments     Date & Time Appointment Department [facility])    Nov 25, 2020  9:30 AM CST Laboratory Visit with REF Mima Simmons Reference Lab ([facility] Ref Lab [facility])        Dec 01, 2020 10:00 AM CST Medicare Annual Well Visit with BENJAMÍN

## 2020-12-01 ENCOUNTER — OFFICE VISIT (OUTPATIENT)
Dept: FAMILY MEDICINE CLINIC | Facility: CLINIC | Age: 75
End: 2020-12-01
Payer: MEDICARE

## 2020-12-01 ENCOUNTER — APPOINTMENT (OUTPATIENT)
Dept: LAB | Age: 75
End: 2020-12-01
Attending: FAMILY MEDICINE
Payer: MEDICARE

## 2020-12-01 VITALS
WEIGHT: 92.19 LBS | TEMPERATURE: 98 F | SYSTOLIC BLOOD PRESSURE: 158 MMHG | BODY MASS INDEX: 18.59 KG/M2 | HEART RATE: 120 BPM | RESPIRATION RATE: 20 BRPM | HEIGHT: 59 IN | DIASTOLIC BLOOD PRESSURE: 78 MMHG

## 2020-12-01 DIAGNOSIS — F41.1 ANXIETY STATE: ICD-10-CM

## 2020-12-01 DIAGNOSIS — G24.01 TARDIVE DYSKINESIA: ICD-10-CM

## 2020-12-01 DIAGNOSIS — M81.0 AGE-RELATED OSTEOPOROSIS WITHOUT CURRENT PATHOLOGICAL FRACTURE: ICD-10-CM

## 2020-12-01 DIAGNOSIS — R63.4 WEIGHT LOSS: ICD-10-CM

## 2020-12-01 DIAGNOSIS — F43.10 POSTTRAUMATIC STRESS DISORDER: ICD-10-CM

## 2020-12-01 DIAGNOSIS — F41.1 GAD (GENERALIZED ANXIETY DISORDER): ICD-10-CM

## 2020-12-01 DIAGNOSIS — F33.1 MODERATE EPISODE OF RECURRENT MAJOR DEPRESSIVE DISORDER (HCC): ICD-10-CM

## 2020-12-01 DIAGNOSIS — E03.9 ACQUIRED HYPOTHYROIDISM: ICD-10-CM

## 2020-12-01 DIAGNOSIS — K59.1 FUNCTIONAL DIARRHEA: ICD-10-CM

## 2020-12-01 DIAGNOSIS — M16.0 PRIMARY OSTEOARTHRITIS OF BOTH HIPS: ICD-10-CM

## 2020-12-01 DIAGNOSIS — Z11.59 NEED FOR HEPATITIS C SCREENING TEST: ICD-10-CM

## 2020-12-01 DIAGNOSIS — R41.3 MEMORY LOSS: ICD-10-CM

## 2020-12-01 DIAGNOSIS — K11.7 DISTURBANCE OF SALIVARY SECRETION: ICD-10-CM

## 2020-12-01 DIAGNOSIS — Z13.6 SCREENING FOR CARDIOVASCULAR CONDITION: ICD-10-CM

## 2020-12-01 DIAGNOSIS — Z00.00 ENCOUNTER FOR ANNUAL HEALTH EXAMINATION: Primary | ICD-10-CM

## 2020-12-01 DIAGNOSIS — L20.82 FLEXURAL ECZEMA: ICD-10-CM

## 2020-12-01 DIAGNOSIS — H04.123 INSUFFICIENCY OF TEAR FILM OF BOTH EYES: ICD-10-CM

## 2020-12-01 DIAGNOSIS — N95.1 SYMPTOMATIC MENOPAUSAL OR FEMALE CLIMACTERIC STATES: ICD-10-CM

## 2020-12-01 DIAGNOSIS — R03.0 ELEVATED BLOOD PRESSURE READING WITHOUT DIAGNOSIS OF HYPERTENSION: ICD-10-CM

## 2020-12-01 DIAGNOSIS — E78.49 FAMILIAL MULTIPLE LIPOPROTEIN-TYPE HYPERLIPIDEMIA: ICD-10-CM

## 2020-12-01 DIAGNOSIS — M35.01 SJOGREN'S SYNDROME WITH KERATOCONJUNCTIVITIS SICCA (HCC): ICD-10-CM

## 2020-12-01 PROBLEM — R52 BODY ACHES: Status: RESOLVED | Noted: 2017-12-13 | Resolved: 2020-12-01

## 2020-12-01 PROCEDURE — 80053 COMPREHEN METABOLIC PANEL: CPT | Performed by: FAMILY MEDICINE

## 2020-12-01 PROCEDURE — 86803 HEPATITIS C AB TEST: CPT | Performed by: FAMILY MEDICINE

## 2020-12-01 PROCEDURE — G0439 PPPS, SUBSEQ VISIT: HCPCS | Performed by: FAMILY MEDICINE

## 2020-12-01 PROCEDURE — 36415 COLL VENOUS BLD VENIPUNCTURE: CPT | Performed by: FAMILY MEDICINE

## 2020-12-01 PROCEDURE — 84443 ASSAY THYROID STIM HORMONE: CPT | Performed by: FAMILY MEDICINE

## 2020-12-01 PROCEDURE — 85025 COMPLETE CBC W/AUTO DIFF WBC: CPT | Performed by: FAMILY MEDICINE

## 2020-12-01 PROCEDURE — 80061 LIPID PANEL: CPT | Performed by: FAMILY MEDICINE

## 2020-12-01 PROCEDURE — 99213 OFFICE O/P EST LOW 20 MIN: CPT | Performed by: FAMILY MEDICINE

## 2020-12-01 NOTE — PATIENT INSTRUCTIONS
Recommended Websites for Advanced Directives    SeekAlumni.no. org/publications/Documents/personal_dec. pdf  An information packet, including necessary form from the [address] website. http://www. idph.state. [place]/public/books/adv

## 2020-12-02 NOTE — PROGRESS NOTES
REASON FOR VISIT:    Maribel Aragon is a 70year old female who presents for a Medicare Annual Wellness visit. She has been feeling pretty good. She denies any pain. She has been seeing her psychiatrist regularly. She has generalized anxiety. Allergies or Sleep. • Calcium Carbonate-Vitamin D (CALCIUM + D OR) Take 2 tablets by mouth daily.            Glucose and HbA1c Latest Ref Rng & Units 12/1/2020 2/27/2020 8/26/2019 6/27/2019 10/10/2018 10/6/2017   Glucose 70 - 99 mg/dL 95 93 95 96 83 77 Need some help  Managing money/bills: Cannot do without help  Taking medications as prescribed: Cannot do without help  Are you able to afford your medications?: Yes  Hearing Problems?: No     Functional Status     Hearing Problems?: No  Vision Problems? : or Procedure   No disease specific diagnoses       ALLERGIES:     Escitalopram            OTHER (SEE COMMENTS)    Comment:Crying/Lashing Out  Iodine (Topical)        OTHER (SEE COMMENTS)    Comment: At stress test - made pt feel \"crazy'  Amoxicillin-Pot Cl week    Drug use: No       REVIEW OF SYSTEMS:   GENERAL: feels well otherwise  SKIN: denies any unusual skin lesions  EYES: denies blurred vision or double vision  HEENT: denies nasal congestion, sinus pain or ST  LUNGS: denies shortness of breath with exe followed by psychiatry. - OFFICE/OUTPT VISIT,EST,LEVL III    3. Moderate episode of recurrent major depressive disorder ([facility] [address]  She has depression that is stable. No new treatment recommended. - OFFICE/OUTPT VISIT,EST,LEVL III  - LIPID PANEL;  Future  - H pressure reading without diagnosis of hypertension  Her blood pressure is elevated. - OFFICE/OUTPT VISIT,EST,LEVL III    14. Memory loss  Memory loss is not changing at present.  - OFFICE/OUTPT VISIT,EST,LEVL III    15.  Insufficiency of tear film of both

## 2020-12-08 ENCOUNTER — TELEPHONE (OUTPATIENT)
Dept: FAMILY MEDICINE CLINIC | Facility: CLINIC | Age: 75
End: 2020-12-08

## 2020-12-08 RX ORDER — BUSPIRONE HYDROCHLORIDE 5 MG/1
10 TABLET ORAL 2 TIMES DAILY
COMMUNITY
Start: 2020-12-02

## 2020-12-08 NOTE — TELEPHONE ENCOUNTER
----- Message from Michelle Rodriguez MD sent at 12/7/2020 12:53 PM CST -----  Blood sugar is normal at 95. Hepatitis C test is negative. Liver tests are normal.  Cholesterol is elevated at 334. HDL cholesterol, the Fabiana Darnell, is very high at 73.   LDL, the

## 2021-02-10 ENCOUNTER — TELEPHONE (OUTPATIENT)
Dept: FAMILY MEDICINE CLINIC | Facility: CLINIC | Age: 76
End: 2021-02-10

## 2021-02-10 NOTE — TELEPHONE ENCOUNTER
Patient informed due for Screening Mammogram.  She/ will call [facility] Patient Scheduling for appt.   Aurelia Tidwell, 02/10/21, 3:54 PM

## 2021-02-22 ENCOUNTER — PATIENT OUTREACH (OUTPATIENT)
Dept: FAMILY MEDICINE CLINIC | Facility: CLINIC | Age: 76
End: 2021-02-22

## 2021-02-22 NOTE — PROGRESS NOTES
Reviewed for mammogram, patient and  will call for scheduling mammogram through [facility], see phone note.

## 2021-03-15 DIAGNOSIS — Z23 NEED FOR VACCINATION: ICD-10-CM

## 2021-04-07 ENCOUNTER — TELEPHONE (OUTPATIENT)
Dept: FAMILY MEDICINE CLINIC | Facility: CLINIC | Age: 76
End: 2021-04-07

## 2021-04-07 NOTE — TELEPHONE ENCOUNTER
took a fall, couldn't straighten her legs, after taking the stairs down to the kitchen wouldn't work again  helped her shuffle to her chair at the table

## 2021-04-07 NOTE — TELEPHONE ENCOUNTER
Patient's  Katherine Hoffman informed of the below recommendations.  expressed understanding and thanks.

## 2021-04-07 NOTE — TELEPHONE ENCOUNTER
Patient's  Moose Tran states patient had her 2nd Covid injection yesterday. States patient was taking a nap this afternoon and at about 5:00pm patient got out of bed and her legs \"were not working. \"  States patient started to fall and kind of just lean

## 2021-04-07 NOTE — TELEPHONE ENCOUNTER
This does sound like a reaction to the Covid vaccine. I recommend waiting and watching at home tonight. If this leg weakness continues to get worse, please go up to the emergency room.   If there is any difficulty with breathing or swallowing please go to

## 2021-06-01 RX ORDER — LEVOTHYROXINE SODIUM 0.07 MG/1
TABLET ORAL
Qty: 90 TABLET | Refills: 1 | Status: SHIPPED | OUTPATIENT
Start: 2021-06-01 | End: 2021-09-13

## 2021-06-01 NOTE — TELEPHONE ENCOUNTER
Levothyroxine:  11/20/20       Return in 1 year (on 12/1/2021). Future appt:     Your appointments     Date & Time Appointment Department [facility])    Dec 03, 2021 10:00 AM CST Laboratory Visit with REF Matthew Posada Reference Lab (EDW Ref Lab [facility])

## 2021-09-13 RX ORDER — LEVOTHYROXINE SODIUM 0.07 MG/1
TABLET ORAL
Qty: 90 TABLET | Refills: 0 | Status: SHIPPED | OUTPATIENT
Start: 2021-09-13 | End: 2021-12-22

## 2021-09-13 NOTE — TELEPHONE ENCOUNTER
Levothyroxine: 6/1/21     Return in 1 year (on 12/1/2021). Future appt:     Your appointments     Date & Time Appointment Department [facility])    Dec 03, 2021 10:00 AM CST Laboratory Visit with REF Gordon Gomez Reference Lab (EDW Ref Lab Radha Castañeda)

## 2021-09-20 ENCOUNTER — TELEPHONE (OUTPATIENT)
Dept: FAMILY MEDICINE CLINIC | Facility: CLINIC | Age: 76
End: 2021-09-20

## 2021-09-20 DIAGNOSIS — M16.0 PRIMARY OSTEOARTHRITIS OF BOTH HIPS: ICD-10-CM

## 2021-09-20 DIAGNOSIS — M81.0 AGE-RELATED OSTEOPOROSIS WITHOUT CURRENT PATHOLOGICAL FRACTURE: Primary | ICD-10-CM

## 2021-09-20 DIAGNOSIS — G24.01 TARDIVE DYSKINESIA: ICD-10-CM

## 2021-09-20 NOTE — TELEPHONE ENCOUNTER
Patient has been seeing [facility] PT . Have not seen request for wheelchair through fax. They will request [facility] will send fax again tomorrow.

## 2021-09-21 NOTE — TELEPHONE ENCOUNTER
Called and left message for Antionette at [address] with pt's , Suman Santana. Suman Santana states pt is in therapy 2 days per week-  Pt is having a difficult time walking and climbing stairs. Suman Santana states pt needs to have a walker at home.   Suman Santana states pt was

## 2021-09-27 ENCOUNTER — TELEPHONE (OUTPATIENT)
Dept: FAMILY MEDICINE CLINIC | Facility: CLINIC | Age: 76
End: 2021-09-27

## 2021-09-27 DIAGNOSIS — M16.0 PRIMARY OSTEOARTHRITIS OF BOTH HIPS: ICD-10-CM

## 2021-09-27 DIAGNOSIS — M81.0 AGE-RELATED OSTEOPOROSIS WITHOUT CURRENT PATHOLOGICAL FRACTURE: Primary | ICD-10-CM

## 2021-09-27 DIAGNOSIS — G24.01 DYSKINESIA, TARDIVE: ICD-10-CM

## 2021-09-27 NOTE — TELEPHONE ENCOUNTER
----- Message from Amanda Staton sent at 9/27/2021  1:46 PM CDT -----  [phone]. Second call. Has questions regarding walker script. Would like walker with wheels.

## 2021-09-27 NOTE — TELEPHONE ENCOUNTER
Pts  calling and states there seems to be an issue or some confusion regarding the walker order we sent to [facility]. They would like a c/b - please advise.

## 2021-09-27 NOTE — TELEPHONE ENCOUNTER
states the order for the walker is needing to be more specific. Requesting order for [employer]. States this is the type of walker used at  Physical Therapy and patient does weill with this type of walker. Please advise.

## 2021-11-05 NOTE — TELEPHONE ENCOUNTER
Patient's daughter Angelita Mederos [facility] patient was diagnosed with Alzheimer's from her Neurologist, Dr. De La Torre.    Daughter states she would like to have patient get established with [place] to help with bathing and physical therapy twice a week.       Please ad

## 2021-11-08 ENCOUNTER — TELEPHONE (OUTPATIENT)
Dept: FAMILY MEDICINE CLINIC | Facility: CLINIC | Age: 76
End: 2021-11-08

## 2021-11-08 NOTE — TELEPHONE ENCOUNTER
In order for pt to qualify for at home care pt needs more recent office visit. Last seen in office on 12/1/20.

## 2021-11-08 NOTE — TELEPHONE ENCOUNTER
Sudhir Andersen informed patient has upcoming appt with   next Wednesday. Informed this will be used as the visit for  Home Health approval/agreed. Hayley Heredia CMA, 11/08/21, 3:54 PM    Future appt:     Your appointments     Date & Time Appointment Depar

## 2021-11-08 NOTE — TELEPHONE ENCOUNTER
12/1/20 Annual Exam faxed to  [facility].   Armani Villagran, [address] [facility], 11/08/21, 2:38 PM

## 2021-11-09 ENCOUNTER — TELEPHONE (OUTPATIENT)
Dept: FAMILY MEDICINE CLINIC | Facility: CLINIC | Age: 76
End: 2021-11-09

## 2021-11-09 NOTE — TELEPHONE ENCOUNTER
Kahlil Restrepo informed spoke with Kelsey Briceño yesterday. Informed patient has an appointment tomorrow for Home Health Consultation. Informed he can call back on Monday to request records.   Eleazar Pedraza CMA, 11/09/21, 10:13 AM

## 2021-11-10 ENCOUNTER — TELEPHONE (OUTPATIENT)
Dept: FAMILY MEDICINE CLINIC | Facility: CLINIC | Age: 76
End: 2021-11-10

## 2021-11-10 NOTE — TELEPHONE ENCOUNTER
Re:  starting  care for nursing today  - will be adding therapy  & [facility]  - - Also would like Dr to address at her upcoming appointment that patient will periodically cry for no reason  & her shaking is quite severe -      Your appointments     Date &

## 2021-11-10 NOTE — TELEPHONE ENCOUNTER
As below. Regarding patient's frequent crying, Kimberley Later states that Dr. Juarez Mcnulty increased patient's Buspirone and Prozac so she advised family to give it some more time to become more effective.   Kimberley Later will send over updated medication list.     Angela

## 2021-11-11 ENCOUNTER — TELEPHONE (OUTPATIENT)
Dept: FAMILY MEDICINE CLINIC | Facility: CLINIC | Age: 76
End: 2021-11-11

## 2021-11-11 NOTE — TELEPHONE ENCOUNTER
Patsey Babinski with [facility] asking for verbal orders for PT. States they would like to do therapy once a week for 3 weeks and then twice a week for 5 weeks. OTW. Please advise.

## 2021-11-12 ENCOUNTER — TELEPHONE (OUTPATIENT)
Dept: FAMILY MEDICINE CLINIC | Facility: CLINIC | Age: 76
End: 2021-11-12

## 2021-11-17 PROBLEM — F02.80 ALZHEIMER DISEASE (HCC): Status: ACTIVE | Noted: 2021-11-17

## 2021-11-17 PROBLEM — K59.1 FUNCTIONAL DIARRHEA: Status: RESOLVED | Noted: 2019-07-23 | Resolved: 2021-11-17

## 2021-11-17 PROBLEM — R25.1 TREMOR: Status: ACTIVE | Noted: 2021-11-17

## 2021-11-17 PROBLEM — G30.9 ALZHEIMER DISEASE (HCC): Status: ACTIVE | Noted: 2021-11-17

## 2021-11-17 NOTE — PROGRESS NOTES
[address]  PROGRESS NOTE  Chief Complaint:   Patient presents with: Follow - Up: Office Visit for Home Health Initiation      HPI:   This is a 78year old female coming in for evaluation for home health services.     She has a tremor th 0. 96 0.55 - 1.02 mg/dL    BUN/CREA Ratio 24.0 (H) 10.0 - 20.0    Calcium, Total 9.4 8.5 - 10.1 mg/dL    Calculated Osmolality 291 275 - 295 mOsm/kg    GFR, Non- 59 (L) >=60    GFR, -American 68 >=60    AST 19 15 - 37 U/L    ALT 20 13 Hyperlipidemia    • Hypothyroidism    • Shingles 2017     Past Surgical History:   Procedure Laterality Date   • CATARACT     • COLONOSCOPY     •      • TONSILLECTOMY     • TUBAL LIGATION       Social History:  Social History    Tobacco Use      Smo mouth as needed. • Carboxymethylcellulose Sodium (REFRESH LIQUIGEL) 1 % Ophthalmic Solution 1 drop by Each eye route as needed. • Calcium Carbonate-Vitamin D (CALCIUM + D OR) Take 2 tablets by mouth daily.           Counseling given: Not Answered She is alert and awake. She is seated in the wheelchair. She was unable to climb on the exam table.   HEENT:  Head:  Normocephalic, atraumatic Eyes: EOMI, PERRLA, no scleral icterus, conjunctivae clear bilaterally, no eye discharge Ears: External normal. uses a walker to ambulate.       Meds & Refills for this Visit:  Requested Prescriptions      No prescriptions requested or ordered in this encounter       Health Maintenance:  Colonoscopy Never done  FIT/FOBT Colorectal Screening due on 09/09/2016  Zoster

## 2021-11-19 ENCOUNTER — TELEPHONE (OUTPATIENT)
Dept: FAMILY MEDICINE CLINIC | Facility: CLINIC | Age: 76
End: 2021-11-19

## 2021-12-03 ENCOUNTER — LAB ENCOUNTER (OUTPATIENT)
Dept: LAB | Age: 76
End: 2021-12-03
Attending: FAMILY MEDICINE
Payer: MEDICARE

## 2021-12-03 DIAGNOSIS — M81.0 AGE-RELATED OSTEOPOROSIS WITHOUT CURRENT PATHOLOGICAL FRACTURE: ICD-10-CM

## 2021-12-03 DIAGNOSIS — E78.49 FAMILIAL MULTIPLE LIPOPROTEIN-TYPE HYPERLIPIDEMIA: ICD-10-CM

## 2021-12-03 DIAGNOSIS — E03.9 ACQUIRED HYPOTHYROIDISM: ICD-10-CM

## 2021-12-03 DIAGNOSIS — F33.1 MODERATE EPISODE OF RECURRENT MAJOR DEPRESSIVE DISORDER (HCC): ICD-10-CM

## 2021-12-03 PROCEDURE — 84550 ASSAY OF BLOOD/URIC ACID: CPT

## 2021-12-03 PROCEDURE — 36415 COLL VENOUS BLD VENIPUNCTURE: CPT

## 2021-12-03 PROCEDURE — 84443 ASSAY THYROID STIM HORMONE: CPT

## 2021-12-03 PROCEDURE — 85025 COMPLETE CBC W/AUTO DIFF WBC: CPT

## 2021-12-03 PROCEDURE — 80053 COMPREHEN METABOLIC PANEL: CPT

## 2021-12-03 PROCEDURE — 80061 LIPID PANEL: CPT

## 2021-12-08 ENCOUNTER — TELEPHONE (OUTPATIENT)
Dept: FAMILY MEDICINE CLINIC | Facility: CLINIC | Age: 76
End: 2021-12-08

## 2021-12-08 ENCOUNTER — OFFICE VISIT (OUTPATIENT)
Dept: FAMILY MEDICINE CLINIC | Facility: CLINIC | Age: 76
End: 2021-12-08
Payer: MEDICARE

## 2021-12-08 VITALS
SYSTOLIC BLOOD PRESSURE: 106 MMHG | HEIGHT: 59 IN | HEART RATE: 104 BPM | RESPIRATION RATE: 18 BRPM | BODY MASS INDEX: 19.35 KG/M2 | WEIGHT: 96 LBS | DIASTOLIC BLOOD PRESSURE: 68 MMHG | OXYGEN SATURATION: 99 % | TEMPERATURE: 98 F

## 2021-12-08 DIAGNOSIS — F43.10 POSTTRAUMATIC STRESS DISORDER: ICD-10-CM

## 2021-12-08 DIAGNOSIS — Z12.31 ENCOUNTER FOR SCREENING MAMMOGRAM FOR MALIGNANT NEOPLASM OF BREAST: ICD-10-CM

## 2021-12-08 DIAGNOSIS — M35.01 SJOGREN'S SYNDROME WITH KERATOCONJUNCTIVITIS SICCA (HCC): ICD-10-CM

## 2021-12-08 DIAGNOSIS — E55.9 VITAMIN D DEFICIENCY, UNSPECIFIED: ICD-10-CM

## 2021-12-08 DIAGNOSIS — M81.0 AGE-RELATED OSTEOPOROSIS WITHOUT CURRENT PATHOLOGICAL FRACTURE: ICD-10-CM

## 2021-12-08 DIAGNOSIS — M16.0 PRIMARY OSTEOARTHRITIS OF BOTH HIPS: ICD-10-CM

## 2021-12-08 DIAGNOSIS — E46 PROTEIN-CALORIE MALNUTRITION, UNSPECIFIED SEVERITY (HCC): Primary | ICD-10-CM

## 2021-12-08 DIAGNOSIS — R25.1 TREMOR: ICD-10-CM

## 2021-12-08 DIAGNOSIS — F02.80 ALZHEIMER DISEASE (HCC): ICD-10-CM

## 2021-12-08 DIAGNOSIS — H04.123 INSUFFICIENCY OF TEAR FILM OF BOTH EYES: ICD-10-CM

## 2021-12-08 DIAGNOSIS — L20.82 FLEXURAL ECZEMA: ICD-10-CM

## 2021-12-08 DIAGNOSIS — G30.9 ALZHEIMER DISEASE (HCC): ICD-10-CM

## 2021-12-08 DIAGNOSIS — E03.9 ACQUIRED HYPOTHYROIDISM: ICD-10-CM

## 2021-12-08 DIAGNOSIS — G24.01 TARDIVE DYSKINESIA: ICD-10-CM

## 2021-12-08 DIAGNOSIS — E46 PROTEIN-CALORIE MALNUTRITION, UNSPECIFIED SEVERITY (HCC): ICD-10-CM

## 2021-12-08 DIAGNOSIS — N95.1 SYMPTOMATIC MENOPAUSAL OR FEMALE CLIMACTERIC STATES: ICD-10-CM

## 2021-12-08 DIAGNOSIS — E78.49 FAMILIAL MULTIPLE LIPOPROTEIN-TYPE HYPERLIPIDEMIA: ICD-10-CM

## 2021-12-08 DIAGNOSIS — F33.1 MODERATE EPISODE OF RECURRENT MAJOR DEPRESSIVE DISORDER (HCC): ICD-10-CM

## 2021-12-08 DIAGNOSIS — Z23 FLU VACCINE NEED: ICD-10-CM

## 2021-12-08 DIAGNOSIS — Z00.00 ENCOUNTER FOR ANNUAL HEALTH EXAMINATION: Primary | ICD-10-CM

## 2021-12-08 DIAGNOSIS — F41.1 GAD (GENERALIZED ANXIETY DISORDER): ICD-10-CM

## 2021-12-08 DIAGNOSIS — K11.7 DISTURBANCE OF SALIVARY SECRETION: ICD-10-CM

## 2021-12-08 PROBLEM — R41.3 MEMORY LOSS: Status: RESOLVED | Noted: 2018-12-18 | Resolved: 2021-12-08

## 2021-12-08 PROBLEM — R03.0 ELEVATED BLOOD PRESSURE READING WITHOUT DIAGNOSIS OF HYPERTENSION: Status: RESOLVED | Noted: 2017-11-30 | Resolved: 2021-12-08

## 2021-12-08 PROCEDURE — 99213 OFFICE O/P EST LOW 20 MIN: CPT | Performed by: FAMILY MEDICINE

## 2021-12-08 PROCEDURE — 90662 IIV NO PRSV INCREASED AG IM: CPT | Performed by: FAMILY MEDICINE

## 2021-12-08 PROCEDURE — G0439 PPPS, SUBSEQ VISIT: HCPCS | Performed by: FAMILY MEDICINE

## 2021-12-08 PROCEDURE — G0008 ADMIN INFLUENZA VIRUS VAC: HCPCS | Performed by: FAMILY MEDICINE

## 2021-12-08 NOTE — PROGRESS NOTES
REASON FOR VISIT:    Antony Patton is a 78year old female who presents for a Medicare Annual Wellness visit. She is here for her annual wellness visit today. She was able to walk in today. Her tremor is somewhat better today.   She has tardive dys Tab Take 10 mg by mouth 2 (two) times daily. • Donepezil HCl 5 MG Oral Tab Take 5 mg by mouth 2 (two) times daily. • FLUoxetine HCl 20 MG Oral Cap Take 60 mg by mouth daily.      • lamoTRIgine 25 MG Oral Tab 2 tablets in AM, 2 tablets in PM.     • without trying?: 2 - No  Has your appetite been poor?: No  Type of Diet: Balanced  How does the patient maintain a good energy level?: Appropriate Exercise  How would you describe your daily physical activity?: Light  How would you describe your current he Preventative Physical Exam only, or if medically necessary    Colorectal Cancer Screening     Colonoscopy Screen every 10 years Colonoscopy Never done    Flex Sigmoidoscopy Screen every 5 years No results found for this or any previous visit.     Fecal Occu 10/07/2016      Pneumovax 23          10/10/2017      TDAP                  09/09/2015      Td                    06/17/2013      Zoster Vaccine Recombinant Adjuvanted (Shingrix)                          04/13/2018        SOCIAL HISTORY:   Social History tenderness  BREAST:deferred   LUNGS: clear to auscultation  CARDIO: RRR without murmur  GI: good BS's, no masses, HSM or tenderness  : deferred  RECTAL: deferred  MUSCULOSKELETAL: back is not tender, FROM of the back  EXTREMITIES: no cyanosis, clubbing o disease associated with significant anxiety and some behavioral disturbances. Plan: Continue the current medications.  - OFFICE/OUTPT VISIT,CHAN LINDSEY III    8.  Moderate episode of recurrent major depressive disorder ([facility] [address]  Her depression is actually fairly flu shot today.  - FLU VACC HIGH DOSE PRSV FREE     The patient indicates understanding of these issues and agrees to the plan.   The patient is asked to return in 6 months for office visit  Diet counseling perfomed    SUGGESTED VACCINATIONS - Influenza, Pn

## 2021-12-08 NOTE — PATIENT INSTRUCTIONS
Flu shot today          Recommended Websites for Advanced Directives    SeekAlumni.no. org/publications/Documents/personal_dec. pdf  An information packet, including necessary form from the [facility] website. http://www. idph.state.

## 2021-12-08 NOTE — TELEPHONE ENCOUNTER
Need lab orders for appt 6/6/22.     Future Appointments   Date Time Provider Guerita Velvet   6/6/2022 10:30 AM [facility] [place] [facility]   6/8/2022 10:30 AM Betty Denis MD [facility] [place]

## 2021-12-20 ENCOUNTER — TELEPHONE (OUTPATIENT)
Dept: FAMILY MEDICINE CLINIC | Facility: CLINIC | Age: 76
End: 2021-12-20

## 2021-12-20 DIAGNOSIS — R53.1 WEAKNESS: Primary | ICD-10-CM

## 2021-12-22 RX ORDER — LEVOTHYROXINE SODIUM 0.07 MG/1
TABLET ORAL
Qty: 90 TABLET | Refills: 1 | Status: SHIPPED | OUTPATIENT
Start: 2021-12-22

## 2021-12-22 NOTE — TELEPHONE ENCOUNTER
Levothyroxine: 9/13/21    Future appt:     Your appointments     Date & Time Appointment Department [facility])    Jun 06, 2022 10:30 AM CDT Laboratory Visit with REF Anabela Tejeda Reference Lab ([facility] Ref Lab [place] [facility])        Jun 08, 2022 10:30 AM CDT Follow

## 2021-12-27 ENCOUNTER — TELEPHONE (OUTPATIENT)
Dept: FAMILY MEDICINE CLINIC | Facility: CLINIC | Age: 76
End: 2021-12-27

## 2021-12-27 NOTE — TELEPHONE ENCOUNTER
Call back from [address] [place] [address] states pt was all of the sudden not able to walk yesterday or today. Also pt incontinent of urine. Elizabeth states they spoke with nurse from [facility] [employer] [facility] at home, Elizabeth states that they were urged to get pt to ER right away.   Elizabeth stat

## 2021-12-27 NOTE — TELEPHONE ENCOUNTER
They decided to just take her to ER, [facility]  daughter's # [phone]  Future Appointments   Date Time Provider Guerita Verdin   6/6/2022 10:30 AM [facility] [place] [facility]   6/8/2022 10:30 AM Zaira Holley MD [facility] [place] [facility] [place] [address] [place]

## 2022-01-13 ENCOUNTER — TELEPHONE (OUTPATIENT)
Dept: FAMILY MEDICINE CLINIC | Facility: CLINIC | Age: 77
End: 2022-01-13

## 2022-01-13 NOTE — TELEPHONE ENCOUNTER
was just d/c d home,     prescribed Marinol 2.5 cap     2 daily   / helps with tremors and appetite     requesting RF       [facility]      Future Appointments   Date Time Provider Guerita Verdin   6/6/2022 10:30 AM [facility] [place] [facility]

## 2022-01-14 RX ORDER — DRONABINOL 2.5 MG/1
2.5 CAPSULE ORAL 2 TIMES DAILY
Qty: 180 CAPSULE | Refills: 1 | Status: SHIPPED | OUTPATIENT
Start: 2022-01-14

## 2022-01-14 RX ORDER — DRONABINOL 2.5 MG/1
2.5 CAPSULE ORAL 2 TIMES DAILY
COMMUNITY
Start: 2022-01-11 | End: 2022-01-14

## 2022-01-14 RX ORDER — DRONABINOL 2.5 MG/1
2.5 CAPSULE ORAL 2 TIMES DAILY
Qty: 60 CAPSULE | Refills: 0 | Status: SHIPPED | OUTPATIENT
Start: 2022-01-14 | End: 2022-01-14

## 2022-01-14 NOTE — TELEPHONE ENCOUNTER
Please advise if you are willing to continue this medication for patient post [place] Discharge.     Mario Christy CMA, 01/14/22, 9:44 AM

## 2022-01-14 NOTE — TELEPHONE ENCOUNTER
Thank you for clarifying where the prescription came from. In this situation, I will continue to prescribe the medication.

## 2022-01-14 NOTE — TELEPHONE ENCOUNTER
This is not a medication that I prescribe routinely. I would recommend continuing to get the prescription from the original prescribing physician.

## 2022-01-14 NOTE — TELEPHONE ENCOUNTER
states they have to pay out of pocket for this medication. States Rx is too expensive through [facility], request  Rx to [facility]. Requesting 90 Rx as this is even cheaper than  A 30 day Rx. Please advise.   Emeka Bey, [address] [place], [place] [address]tara, 01/14/22,

## 2022-01-14 NOTE — TELEPHONE ENCOUNTER
informed of below. States patient was given this medication by   [facility] while at [facility].  states this medication not only helped with her appetite, but helped with the tremors she had been having.     States [place] will not refil

## 2022-02-01 ENCOUNTER — TELEPHONE (OUTPATIENT)
Dept: FAMILY MEDICINE CLINIC | Facility: CLINIC | Age: 77
End: 2022-02-01

## 2022-02-01 NOTE — TELEPHONE ENCOUNTER
Letter received from [employer] for prior authorization for dronabinol. Forms completed.

## 2022-02-03 ENCOUNTER — TELEPHONE (OUTPATIENT)
Dept: FAMILY MEDICINE CLINIC | Facility: CLINIC | Age: 77
End: 2022-02-03

## 2022-02-03 NOTE — TELEPHONE ENCOUNTER
cause of malnutrition  ?      need dx code       please advise       Future Appointments   Date Time Provider Guerita Verdin   6/6/2022 10:30 AM RE[facility]   6/8/2022 10:30 AM Gustavo Chand MD [facility] [place] [facility] [place]

## 2022-02-04 NOTE — TELEPHONE ENCOUNTER
Fax received from [facility]. They are requesting additional information. They specifically wonder what the causes of her poor appetite and malnutrition. Her appetite is poor due to multiple factors. First, she has Alzheimer's disease and has essentially lost interest in eating. Secondly, her anxiety levels had been acute and severe. Because of her recent hospitalization and other medical issues her anxiety levels have been much worse and this has adversely impacted her appetite. She was given dronabinol in the hospital and then continued in skilled nursing. It was very effective in increasing her appetite.

## 2022-03-24 ENCOUNTER — TELEPHONE (OUTPATIENT)
Dept: FAMILY MEDICINE CLINIC | Facility: CLINIC | Age: 77
End: 2022-03-24

## 2022-03-24 NOTE — TELEPHONE ENCOUNTER
Spoke to Elizabeth, pt daughter, who stated pt is taking marinol 1 po bid but her shaking is getting worse. Wondering about increasing the dose for pt to 2 tabs po bid? Advised Dr. Jeannette Estrada is out of the office I will check with another physician and we will let her know. Elizabeth stated she understands and is agreeable. Please review and advise.

## 2022-03-24 NOTE — TELEPHONE ENCOUNTER
Recommend to continue with current dose of medication. Recommend to schedule follow-up appointment with Dr. Yvette Mullen to discuss changing medication.

## 2022-03-25 NOTE — TELEPHONE ENCOUNTER
Elizabeth informed of below. Expressed understanding. Patient informed  out of office until  4/4. Advised to call 's Office to let him  Know of increase in shaking. Informed  may have an alternative medication to the dronabinol that could be covered under insurance as this medication is not and is an  Out of pocket expense/agrees. She will call 's office next week.

## 2022-04-04 ENCOUNTER — TELEPHONE (OUTPATIENT)
Dept: FAMILY MEDICINE CLINIC | Facility: CLINIC | Age: 77
End: 2022-04-04

## 2022-04-05 NOTE — TELEPHONE ENCOUNTER
Bill informed Placard Form completed. At  for .   Yasmin Beckford, [facility], 04/05/22, 10:01 AM

## 2022-04-25 RX ORDER — MEMANTINE HYDROCHLORIDE 10 MG/1
10 TABLET ORAL 2 TIMES DAILY
Qty: 180 TABLET | Refills: 1 | Status: SHIPPED | OUTPATIENT
Start: 2022-04-25

## 2022-04-25 RX ORDER — FLUOXETINE HYDROCHLORIDE 40 MG/1
40 CAPSULE ORAL DAILY
Qty: 90 CAPSULE | Refills: 1 | Status: SHIPPED | OUTPATIENT
Start: 2022-04-25

## 2022-04-25 RX ORDER — FLUOXETINE HYDROCHLORIDE 40 MG/1
40 CAPSULE ORAL DAILY
COMMUNITY
Start: 2022-01-11 | End: 2022-04-25

## 2022-04-25 RX ORDER — FLUOXETINE HYDROCHLORIDE 20 MG/1
60 CAPSULE ORAL DAILY
Qty: 270 CAPSULE | Refills: 1 | Status: CANCELLED | OUTPATIENT
Start: 2022-04-25

## 2022-05-02 ENCOUNTER — TELEPHONE (OUTPATIENT)
Dept: FAMILY MEDICINE CLINIC | Facility: CLINIC | Age: 77
End: 2022-05-02

## 2022-05-02 NOTE — TELEPHONE ENCOUNTER
I would recommend trying Metamucil. Often adding fiber to the stools will help keep firm enough to prevent this diarrhea. Hopefully this will also help her to have a bowel movement regularly every day. Please purchase over-the-counter Metamucil and have her do 1 tablespoon of Metamucil in some sort of liquid (thick juice actually works best) every day.

## 2022-05-02 NOTE — TELEPHONE ENCOUNTER
informed of below. Expressed understanding.   Cindy [facility], 05/02/22, 2:57 PM

## 2022-05-02 NOTE — TELEPHONE ENCOUNTER
states that when patient has a bowel movement it is loose/runny. States she does not have a bowel movement daily,   Every other day or so, but loose/runny. Asking what patient can take to take care of this issue? Please advise.

## 2022-05-11 ENCOUNTER — TELEPHONE (OUTPATIENT)
Dept: FAMILY MEDICINE CLINIC | Facility: CLINIC | Age: 77
End: 2022-05-11

## 2022-05-11 NOTE — TELEPHONE ENCOUNTER
Florentino Summers states that she needs verbal approval from  for pt to have P.T. 2x a week 9 more visits. Would like a call back.

## 2022-05-11 NOTE — TELEPHONE ENCOUNTER
Faizan Salas states that she needs verbal approval from  for pt to have P.T. 2x a week 9 more visits. Would like a call back.

## 2022-05-11 NOTE — TELEPHONE ENCOUNTER
Luis Antonio Hopes informed of below. She will fax order to .   Cuco Baca, [facility], 05/11/22, 3:08 PM

## 2022-05-19 ENCOUNTER — OFFICE VISIT (OUTPATIENT)
Dept: FAMILY MEDICINE CLINIC | Facility: CLINIC | Age: 77
End: 2022-05-19
Payer: MEDICARE

## 2022-05-19 VITALS
TEMPERATURE: 97 F | RESPIRATION RATE: 18 BRPM | HEART RATE: 89 BPM | OXYGEN SATURATION: 99 % | WEIGHT: 86 LBS | DIASTOLIC BLOOD PRESSURE: 82 MMHG | BODY MASS INDEX: 17.34 KG/M2 | HEIGHT: 59 IN | SYSTOLIC BLOOD PRESSURE: 115 MMHG

## 2022-05-19 DIAGNOSIS — L98.9 SORE ON TOE: Primary | ICD-10-CM

## 2022-05-19 DIAGNOSIS — G24.01 TARDIVE DYSKINESIA: ICD-10-CM

## 2022-05-19 DIAGNOSIS — F02.80 ALZHEIMER DISEASE (HCC): ICD-10-CM

## 2022-05-19 DIAGNOSIS — G30.9 ALZHEIMER DISEASE (HCC): ICD-10-CM

## 2022-05-19 PROBLEM — U07.1 COVID-19: Status: ACTIVE | Noted: 2021-12-27

## 2022-05-19 PROCEDURE — 99214 OFFICE O/P EST MOD 30 MIN: CPT | Performed by: NURSE PRACTITIONER

## 2022-05-19 NOTE — PATIENT INSTRUCTIONS
Wheelchair order. For toe:   Warm epsom salt soak 2-3 times a day for the next week, apply topical neosporin ointment to site  If worsening or no significant improvement notify the office

## 2022-06-06 ENCOUNTER — TELEPHONE (OUTPATIENT)
Dept: FAMILY MEDICINE CLINIC | Facility: CLINIC | Age: 77
End: 2022-06-06

## 2022-06-06 ENCOUNTER — LABORATORY ENCOUNTER (OUTPATIENT)
Dept: LAB | Age: 77
End: 2022-06-06
Attending: FAMILY MEDICINE
Payer: MEDICARE

## 2022-06-06 DIAGNOSIS — E03.9 ACQUIRED HYPOTHYROIDISM: ICD-10-CM

## 2022-06-06 DIAGNOSIS — E46 PROTEIN-CALORIE MALNUTRITION, UNSPECIFIED SEVERITY (HCC): ICD-10-CM

## 2022-06-06 DIAGNOSIS — M35.01 SJOGREN'S SYNDROME WITH KERATOCONJUNCTIVITIS SICCA (HCC): ICD-10-CM

## 2022-06-06 DIAGNOSIS — F02.80 ALZHEIMER DISEASE (HCC): ICD-10-CM

## 2022-06-06 DIAGNOSIS — E55.9 VITAMIN D DEFICIENCY, UNSPECIFIED: ICD-10-CM

## 2022-06-06 DIAGNOSIS — M81.0 AGE-RELATED OSTEOPOROSIS WITHOUT CURRENT PATHOLOGICAL FRACTURE: ICD-10-CM

## 2022-06-06 DIAGNOSIS — G30.9 ALZHEIMER DISEASE (HCC): ICD-10-CM

## 2022-06-06 LAB
ALBUMIN SERPL-MCNC: 3.5 G/DL (ref 3.4–5)
ALBUMIN/GLOB SERPL: 0.8 {RATIO} (ref 1–2)
ALP LIVER SERPL-CCNC: 77 U/L
ALT SERPL-CCNC: 37 U/L
ANION GAP SERPL CALC-SCNC: 6 MMOL/L (ref 0–18)
AST SERPL-CCNC: 28 U/L (ref 15–37)
BASOPHILS # BLD AUTO: 0.06 X10(3) UL (ref 0–0.2)
BASOPHILS NFR BLD AUTO: 0.5 %
BILIRUB SERPL-MCNC: 0.3 MG/DL (ref 0.1–2)
BUN BLD-MCNC: 22 MG/DL (ref 7–18)
CALCIUM BLD-MCNC: 9.4 MG/DL (ref 8.5–10.1)
CHLORIDE SERPL-SCNC: 104 MMOL/L (ref 98–112)
CO2 SERPL-SCNC: 27 MMOL/L (ref 21–32)
CREAT BLD-MCNC: 0.85 MG/DL
EOSINOPHIL # BLD AUTO: 0.25 X10(3) UL (ref 0–0.7)
EOSINOPHIL NFR BLD AUTO: 2.3 %
ERYTHROCYTE [DISTWIDTH] IN BLOOD BY AUTOMATED COUNT: 11.9 %
FASTING STATUS PATIENT QL REPORTED: YES
GLOBULIN PLAS-MCNC: 4.3 G/DL (ref 2.8–4.4)
GLUCOSE BLD-MCNC: 105 MG/DL (ref 70–99)
HCT VFR BLD AUTO: 42.9 %
HGB BLD-MCNC: 13.8 G/DL
IMM GRANULOCYTES # BLD AUTO: 0.08 X10(3) UL (ref 0–1)
IMM GRANULOCYTES NFR BLD: 0.7 %
LYMPHOCYTES # BLD AUTO: 2.92 X10(3) UL (ref 1–4)
LYMPHOCYTES NFR BLD AUTO: 26.4 %
MCH RBC QN AUTO: 31.4 PG (ref 26–34)
MCHC RBC AUTO-ENTMCNC: 32.2 G/DL (ref 31–37)
MCV RBC AUTO: 97.7 FL
MONOCYTES # BLD AUTO: 0.56 X10(3) UL (ref 0.1–1)
MONOCYTES NFR BLD AUTO: 5.1 %
NEUTROPHILS # BLD AUTO: 7.2 X10 (3) UL (ref 1.5–7.7)
NEUTROPHILS # BLD AUTO: 7.2 X10(3) UL (ref 1.5–7.7)
NEUTROPHILS NFR BLD AUTO: 65 %
OSMOLALITY SERPL CALC.SUM OF ELEC: 288 MOSM/KG (ref 275–295)
PLATELET # BLD AUTO: 355 10(3)UL (ref 150–450)
POTASSIUM SERPL-SCNC: 3.6 MMOL/L (ref 3.5–5.1)
PROT SERPL-MCNC: 7.8 G/DL (ref 6.4–8.2)
RBC # BLD AUTO: 4.39 X10(6)UL
SODIUM SERPL-SCNC: 137 MMOL/L (ref 136–145)
VIT D+METAB SERPL-MCNC: 23.4 NG/ML (ref 30–100)
WBC # BLD AUTO: 11.1 X10(3) UL (ref 4–11)

## 2022-06-06 PROCEDURE — 85025 COMPLETE CBC W/AUTO DIFF WBC: CPT

## 2022-06-06 PROCEDURE — 36415 COLL VENOUS BLD VENIPUNCTURE: CPT

## 2022-06-06 PROCEDURE — 80053 COMPREHEN METABOLIC PANEL: CPT

## 2022-06-06 PROCEDURE — 82306 VITAMIN D 25 HYDROXY: CPT

## 2022-06-06 NOTE — TELEPHONE ENCOUNTER
Note below and OV notes dated 5/19/2022 faxed to Boris Stone as needed for wheelchair coverage through Medicare.

## 2022-06-06 NOTE — TELEPHONE ENCOUNTER
Additional DME documentation needed for patient for wheelchair. From note:  \"Additionally, patient and  asking for wheelchair order for Medicare. Is renting one from [employer] right now which they'd like to try and get for the patient to use. Is using due to worsening cognitive dysfunction and alzheimer's disease and progressive tremors and fall risk. Patient uses walker at home though needs to use the wheelchair more often at this point. \"    Patient with progressive limitations secondary to mobility difficulties from Alzheimer's, worsening cognitive dysfunction, progressive tremors, and fall risk. Patient was currently renting wheelchair and was suiting needs around the home, there is adequate spacing around the home. Both the patient and caregiver (spouse) are wiling to use the wheelchair and her spouse can help assist the patient with proper use. She does not require heavy duty wheelchair due to weight. Patient has been able to use the wheelchair at home with upper extremity function and it has not affected her use of item at home. Thus far, the rented wheelchair has greatly been able to improve patient's safety and reduce fall risk within the home and out of the home and improve ability to participate in care.

## 2022-06-08 ENCOUNTER — TELEPHONE (OUTPATIENT)
Dept: FAMILY MEDICINE CLINIC | Facility: CLINIC | Age: 77
End: 2022-06-08

## 2022-06-08 NOTE — TELEPHONE ENCOUNTER
Please call the patient's spouse. Medicare requiring additional documentation regarding wheelchair coverage. Please inquire how the patient uses the walker and wheelchair at home, medicare will not cover the wheelchair unless a cane or walker does not resolve the patients mobility limitation. Ex. Frequent falls? Generalized weakness? Difficulty managing use, coordination, balance, etc?  Cognitive limitations?

## 2022-06-08 NOTE — PATIENT INSTRUCTIONS
Please take a calcium supplement daily - look for a chewable and try to get approximately 1000 mg daily.

## 2022-06-08 NOTE — TELEPHONE ENCOUNTER
Spoke with patient's  Tylor Harper. States depending on patient's stability at the time, she goes between walking alone, having him help her, using a walker, or using the wheelchair.  states as the day progresses, patient is able to walk less and less. States patient gets very shaky come afternoon/evening.  states when patient is too unsteady and unable to use the walker, he will push her in the wheelchair where she needs to go around the house. States outside, patient is too unsteady and uses the wheelchair at all times. States they bring the wheelchair with them whenever they leave the house.  states patient has not had any recent falls; last one was at least a month ago.  states patient does get confused at times. States you can sit and talk with her and she may seem fine one minute but the next maybe not. States sometimes she doesn't know who he is. Sometimes she gets confused as to what the bathroom is and what the bedroom is. States patient cannot remember dates or times. States if you ask her to write a clock, she cannot. States patient can no longer cook or do things around the house.  states patient has sundowning.

## 2022-06-08 NOTE — TELEPHONE ENCOUNTER
Please provide to Lisa's:     Use of wheelchair around the home recommended due to patient's cognitive limitations as well as progressive weakness and unsteady gait despite use of walker with increased shaking, unsteady gait, and increased fall and safety risk.

## 2022-06-14 ENCOUNTER — TELEPHONE (OUTPATIENT)
Dept: FAMILY MEDICINE CLINIC | Facility: CLINIC | Age: 77
End: 2022-06-14

## 2022-06-14 NOTE — TELEPHONE ENCOUNTER
just wants to let  know that [facility] will be sending a request for more information regarding her transport chair per pt's insurance

## 2022-06-16 ENCOUNTER — TELEPHONE (OUTPATIENT)
Dept: FAMILY MEDICINE CLINIC | Facility: CLINIC | Age: 77
End: 2022-06-16

## 2022-06-16 NOTE — TELEPHONE ENCOUNTER
Spoke with Umm Howard. States as long as patient is using the walker at times at home, she does not meet the criteria for insurance to pay for a wheelchair. See phone note dated 6/8/2022. Umm Howard informed of my conversation with patient's  Sabin Scales: \"States depending on patient's stability at the time, she goes between walking alone, having him help her, using a walker, or using the wheelchair.  states as the day progresses, patient is able to walk less and less. States patient gets very shaky come afternoon/evening. \"    Umm Howard states yes, since this is what he told you, she doesn't meet the criteria. Umm Howard will call  and offer him cash price for the wheelchair.

## 2022-06-16 NOTE — TELEPHONE ENCOUNTER
Patient is renting wheelchair till script is approved. [facility] checking to see if dr approval can be expedited. See PN from 6/8/22. Fax [phone].

## 2022-06-20 ENCOUNTER — TELEPHONE (OUTPATIENT)
Dept: FAMILY MEDICINE CLINIC | Facility: CLINIC | Age: 77
End: 2022-06-20

## 2022-06-20 NOTE — TELEPHONE ENCOUNTER
See phone note dated 6/16/2022. Patient's  Scott Uvaldo informed of my conversation with Wing Canales at [facility].  expressed understanding and thanks.

## 2022-06-20 NOTE — TELEPHONE ENCOUNTER
pt's spouse wants to speak to [facility] about the confusion in getting pt's chair from [facility]- states there is some confusion

## 2022-07-07 ENCOUNTER — TELEPHONE (OUTPATIENT)
Dept: FAMILY MEDICINE CLINIC | Facility: CLINIC | Age: 77
End: 2022-07-07

## 2022-07-07 NOTE — TELEPHONE ENCOUNTER
Continuing with balance at home, and recertified her  Future Appointments   Date Time Provider Guerita Verdin   12/14/2022  9:30 AM REF [facility]   12/20/2022  9:45 AM Gerald Donald MD [facility] [place] [facility] [place]

## 2022-07-07 NOTE — TELEPHONE ENCOUNTER
Spoke to Ebony who stated pt has been re-certified for PT at home for balance. Pt will continue to get PT 1x/wk.

## 2022-07-13 ENCOUNTER — TELEPHONE (OUTPATIENT)
Dept: FAMILY MEDICINE CLINIC | Facility: CLINIC | Age: 77
End: 2022-07-13

## 2022-07-13 DIAGNOSIS — R30.0 DYSURIA: Primary | ICD-10-CM

## 2022-07-13 NOTE — TELEPHONE ENCOUNTER
Per Carmen Thompson-  Patient has low grade fever/dark colored urine/  Slight confusion. Order faxed.   Jennifer Simpson CMA, 07/13/22, 4:47 PM

## 2022-07-15 ENCOUNTER — TELEPHONE (OUTPATIENT)
Dept: FAMILY MEDICINE CLINIC | Facility: CLINIC | Age: 77
End: 2022-07-15

## 2022-07-15 NOTE — TELEPHONE ENCOUNTER
Noted, will await specimen. Notify office or immediate care this weekend if urinary symptoms develop/fever/confusion.

## 2022-07-15 NOTE — TELEPHONE ENCOUNTER
Spoke to pt  Karishma Medrano, stated that nurse from Home health is coming this weekend to get sample and check on pt.      Stated that pt is feeling ok, denies any urinary symptoms at this time

## 2022-07-15 NOTE — TELEPHONE ENCOUNTER
Please call patient's . Urine order submitted though I do not see any labs that have come back yet from home health. Please get update on patient's urinary symptoms.

## 2022-08-19 ENCOUNTER — TELEPHONE (OUTPATIENT)
Dept: FAMILY MEDICINE CLINIC | Facility: CLINIC | Age: 77
End: 2022-08-19

## 2022-08-19 NOTE — TELEPHONE ENCOUNTER
Patient had fall at 7:00 this morning, patient in a lot of pain -- left thigh.   Home health nurse calling to report,

## 2022-08-19 NOTE — TELEPHONE ENCOUNTER
Per Steffi/PT-  At patient's home for physical therapy. [facility] patient fell this morning around 7am.  [facility] patient's pain scale is 10/10. Left Thigh. Has not walked around the house much this morning. Transported by wheelchair/  To the bathroom. Per ---  911 transport to [facility] for evaluation of symptoms. Steffi will call for transport.

## 2022-11-30 ENCOUNTER — PATIENT OUTREACH (OUTPATIENT)
Dept: CASE MANAGEMENT | Age: 77
End: 2022-11-30

## 2022-11-30 ENCOUNTER — TELEPHONE (OUTPATIENT)
Dept: FAMILY MEDICINE CLINIC | Facility: CLINIC | Age: 77
End: 2022-11-30

## 2022-11-30 NOTE — TELEPHONE ENCOUNTER
Phoned Daughter Elizabeth--  Patient is being discharged from the SNF part  Of [place] [address] to the Assisted Living portion  To live with her . Patient has a new PCP Dr.Sanjay Hanson  At [place] [address].  no longer PCP. [facility] informed of above. He will update.   Bob Leon, [facility], 11/30/22, 9:34 AM

## 2022-11-30 NOTE — PROCEDURES
The office order for PCP request is Approved and completed on November 30, 2022.     Thanks,  [facility] [employer]

## 2023-01-03 ENCOUNTER — NURSING HOME VISIT (OUTPATIENT)
Dept: NEPHROLOGY | Age: 78
End: 2023-01-03

## 2023-01-03 DIAGNOSIS — N17.9 AKI (ACUTE KIDNEY INJURY) (CMD): Primary | ICD-10-CM

## 2023-01-03 PROCEDURE — 99349 HOME/RES VST EST MOD MDM 40: CPT | Performed by: NURSE PRACTITIONER

## 2024-09-13 NOTE — TELEPHONE ENCOUNTER
Future appt:    Last Appointment with provider:   8/26/2019    Px with Dr. Marina Douglas on 10/15/19.        Last appointment at [facility]:  10/15/2019  Cholesterol, Total (mg/dL)   Date Value   10/10/2018 283 (H)     HDL Cholesterol (mg/dL)   Date Value   10
no

## (undated) NOTE — LETTER
06/13/20        [address]      Dear Luh Scott,    [facility] records indicate that you have outstanding lab work and or testing that was ordered for you and has not yet been completed:  Orders Placed This Encounter

## (undated) NOTE — MR AVS SNAPSHOT
[address]  [place] [address] [id number]  [phone]               Thank you for choosing us for your health care visit with Brian Brooks MD.  We are glad to serve you and happy to provide you with this summary o 1 drop by Each eye route daily as needed. REFRESH LIQUIGEL 1 % Soln   Generic drug:  Carboxymethylcellulose Sodium   1 drop by Each eye route nightly.            SYNTHROID 88 MCG Tabs   Generic drug:  Levothyroxine Sodium   Take 1 tablet by mouth Weight Reduction Maintain normal body weight (body mass index 18.5-24.9 kg/m2)   DASH eating plan Adopt a diet rich in fruits, vegetables, and low fat dairy products with reduced content of saturated and total fat.    Dietary sodium reduction Reduce dietary

## (undated) NOTE — MR AVS SNAPSHOT
[address]  [place] [address] [id number]  [phone]               Thank you for choosing us for your health care visit with Deepa Ramos MD.  We are glad to serve you and happy to provide you with this summary o Generic drug:  Levothyroxine Sodium   Take 1 tablet by mouth every morning before breakfast.           * Notice: This list has 2 medication(s) that are the same as other medications prescribed for you.  Read the directions carefully, and ask your doctor or Moderation of alcohol consumption Men: limit to <= 2 drinks* per day. Women and lighter weight persons: limit to <= 1 drink* per day.               Visit [facility] at  [email or web address]